# Patient Record
Sex: FEMALE | Race: WHITE | NOT HISPANIC OR LATINO | Employment: FULL TIME | RURAL
[De-identification: names, ages, dates, MRNs, and addresses within clinical notes are randomized per-mention and may not be internally consistent; named-entity substitution may affect disease eponyms.]

---

## 2022-02-17 ENCOUNTER — PATIENT ROUNDING (BHMG ONLY) (OUTPATIENT)
Dept: FAMILY MEDICINE CLINIC | Facility: CLINIC | Age: 42
End: 2022-02-17

## 2022-02-17 ENCOUNTER — OFFICE VISIT (OUTPATIENT)
Dept: FAMILY MEDICINE CLINIC | Facility: CLINIC | Age: 42
End: 2022-02-17

## 2022-02-17 VITALS
HEART RATE: 80 BPM | HEIGHT: 55 IN | SYSTOLIC BLOOD PRESSURE: 126 MMHG | BODY MASS INDEX: 47.9 KG/M2 | WEIGHT: 207 LBS | RESPIRATION RATE: 16 BRPM | TEMPERATURE: 97.3 F | DIASTOLIC BLOOD PRESSURE: 80 MMHG | OXYGEN SATURATION: 98 %

## 2022-02-17 DIAGNOSIS — E66.01 CLASS 3 SEVERE OBESITY DUE TO EXCESS CALORIES WITH SERIOUS COMORBIDITY AND BODY MASS INDEX (BMI) OF 50.0 TO 59.9 IN ADULT: ICD-10-CM

## 2022-02-17 DIAGNOSIS — J45.20 MILD INTERMITTENT ASTHMA WITHOUT COMPLICATION: ICD-10-CM

## 2022-02-17 DIAGNOSIS — F41.9 ANXIETY: ICD-10-CM

## 2022-02-17 DIAGNOSIS — R60.9 PERIPHERAL EDEMA: Primary | ICD-10-CM

## 2022-02-17 DIAGNOSIS — R91.1 NODULE OF RIGHT LUNG: ICD-10-CM

## 2022-02-17 DIAGNOSIS — M54.50 CHRONIC BILATERAL LOW BACK PAIN WITHOUT SCIATICA: ICD-10-CM

## 2022-02-17 DIAGNOSIS — K76.0 FATTY LIVER: ICD-10-CM

## 2022-02-17 DIAGNOSIS — G89.29 CHRONIC BILATERAL LOW BACK PAIN WITHOUT SCIATICA: ICD-10-CM

## 2022-02-17 DIAGNOSIS — Z00.00 WELLNESS EXAMINATION: ICD-10-CM

## 2022-02-17 DIAGNOSIS — G43.109 MIGRAINE WITH AURA AND WITHOUT STATUS MIGRAINOSUS, NOT INTRACTABLE: ICD-10-CM

## 2022-02-17 DIAGNOSIS — M25.521 RIGHT ELBOW PAIN: ICD-10-CM

## 2022-02-17 PROBLEM — R60.0 PERIPHERAL EDEMA: Status: ACTIVE | Noted: 2022-02-17

## 2022-02-17 PROCEDURE — 99204 OFFICE O/P NEW MOD 45 MIN: CPT | Performed by: NURSE PRACTITIONER

## 2022-02-17 RX ORDER — ZONISAMIDE 25 MG/1
CAPSULE ORAL
COMMUNITY
End: 2022-02-17 | Stop reason: SDUPTHER

## 2022-02-17 RX ORDER — BUSPIRONE HYDROCHLORIDE 5 MG/1
TABLET ORAL 2 TIMES DAILY
COMMUNITY
End: 2022-02-17 | Stop reason: SDUPTHER

## 2022-02-17 RX ORDER — LEVALBUTEROL TARTRATE 45 UG/1
AEROSOL, METERED ORAL
COMMUNITY
End: 2022-02-17 | Stop reason: SDUPTHER

## 2022-02-17 RX ORDER — BUSPIRONE HYDROCHLORIDE 7.5 MG/1
7.5 TABLET ORAL 2 TIMES DAILY PRN
Qty: 60 TABLET | Refills: 5 | Status: SHIPPED | OUTPATIENT
Start: 2022-02-17 | End: 2022-02-17 | Stop reason: CLARIF

## 2022-02-17 RX ORDER — FUROSEMIDE 20 MG/1
TABLET ORAL
COMMUNITY
End: 2022-02-17 | Stop reason: SDUPTHER

## 2022-02-17 RX ORDER — DICLOFENAC 35 MG/1
CAPSULE ORAL
COMMUNITY
End: 2022-02-17

## 2022-02-17 RX ORDER — ZONISAMIDE 25 MG/1
25 CAPSULE ORAL 2 TIMES DAILY
Qty: 60 CAPSULE | Refills: 5 | Status: SHIPPED | OUTPATIENT
Start: 2022-02-17

## 2022-02-17 RX ORDER — LEVALBUTEROL TARTRATE 45 UG/1
2 AEROSOL, METERED ORAL EVERY 6 HOURS PRN
Qty: 15 G | Refills: 5 | Status: SHIPPED | OUTPATIENT
Start: 2022-02-17

## 2022-02-17 RX ORDER — OXYCODONE HYDROCHLORIDE 5 MG/1
CAPSULE ORAL
COMMUNITY
End: 2022-02-17

## 2022-02-17 RX ORDER — BUSPIRONE HYDROCHLORIDE 15 MG/1
7.5 TABLET ORAL 2 TIMES DAILY
Qty: 30 TABLET | Refills: 5 | Status: SHIPPED | OUTPATIENT
Start: 2022-02-17

## 2022-02-17 RX ORDER — FUROSEMIDE 20 MG/1
20 TABLET ORAL DAILY PRN
Qty: 30 TABLET | Refills: 5 | Status: SHIPPED | OUTPATIENT
Start: 2022-02-17

## 2022-02-17 NOTE — PROGRESS NOTES
"Call from [facility] \"her insurance will not cover her Buspar 7.5 mg bid, but they will cover 15 mg one half twice daily\" spoke to provider and verbal ok for the change\" E-rx done  "

## 2022-02-17 NOTE — PROGRESS NOTES
February 17, 2022    Hello, may I speak with Anna Means?    My name is Leanna      I am  with [facility]  [address] [facility] [address] [phone].    Before we get started may I verify your date of birth? 1980    I am calling to officially welcome you to our practice and ask about your recent visit. Is this a good time to talk? yes    Tell me about your visit with us. What things went well?  It went well, everything was great. No improvements needed.       We're always looking for ways to make our patients' experiences even better. Do you have recommendations on ways we may improve?  no    Overall were you satisfied with your first visit to our practice? yes       I appreciate you taking the time to speak with me today. Is there anything else I can do for you? no      Thank you, and have a great day.

## 2022-02-17 NOTE — PROGRESS NOTES
Subjective   Chief Complaint:  Visit to [facility], multiple issues    History of Present Illness:  This 42 y.o. female was seen in the office today.    Peripheral edema: Reports stable on Lasix as needed-request refill.    Migraines: Reports stable on sonogram-reports no recent flareups.    Anxiety: Reports uses BuSpar sparingly, reports not interested in daily medication at this point.    Mild intermittent asthma: Reports stable on Xopenex, reports unable to take albuterol.    Fatty liver: Reports prior diagnosis-reports last ultrasound was clear of any cirrhotic/porous tissue.    Right lung nodule: Reports this was found approximately 1 year ago and sol-rayed in November.  Reports no changes.    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    Chronic back pain: Reports chronic low back pain with some degenerative changes-has a disc she has on hand.  Reports that currently not flared up.    Allergies   Allergen Reactions   • Diphenhydramine Unknown - Low Severity   • Gabapentin Delirium   • Topiramate Hallucinations   • Dicyclomine Palpitations      Current Outpatient Medications on File Prior to Visit   Medication Sig   • [DISCONTINUED] busPIRone (BUSPAR) 5 MG tablet 2 (Two) Times a Day.   • [DISCONTINUED] furosemide (LASIX) 20 MG tablet furosemide 20 mg tablet   TAKE 1/2 TO 1 TABLET BY MOUTH EVERY DAY AS NEEDED   • [DISCONTINUED] levalbuterol (Xopenex HFA) 45 MCG/ACT inhaler Xopenex HFA 45 mcg/actuation aerosol inhaler   • [DISCONTINUED] zonisamide (ZONEGRAN) 25 MG capsule zonisamide 25 mg capsule   TAKE 1 CAPSULE BY MOUTH TWICE DAILY   • [DISCONTINUED] Diclofenac 35 MG capsule diclofenac submicronized 35 mg capsule   • [DISCONTINUED] oxyCODONE (OXY-IR) 5 MG capsule oxycodone 5 mg capsule     No current facility-administered medications on file prior to visit.      Past Medical, Surgical, Social, and Family History:  History reviewed. No pertinent past medical  history.  History reviewed. No pertinent surgical history.  Social History     Socioeconomic History   • Marital status:    Tobacco Use   • Smoking status: Former Smoker     Quit date: 2005     Years since quittin.1     History reviewed. No pertinent family history.  Objective   Physical Exam  Vitals and nursing note reviewed.   Constitutional:       General: She is not in acute distress.     Appearance: Normal appearance. She is obese. She is not diaphoretic.   HENT:      Head: Normocephalic and atraumatic.      Nose: Nose normal.   Eyes:      Conjunctiva/sclera: Conjunctivae normal.      Pupils: Pupils are equal, round, and reactive to light.   Neck:      Thyroid: No thyroid mass, thyromegaly or thyroid tenderness.      Vascular: No carotid bruit or JVD.      Trachea: No tracheal deviation.   Cardiovascular:      Rate and Rhythm: Normal rate and regular rhythm.      Pulses:           Dorsalis pedis pulses are 2+ on the right side and 2+ on the left side.        Posterior tibial pulses are 2+ on the right side and 2+ on the left side.      Heart sounds: Normal heart sounds. No murmur heard.  No friction rub. No gallop.    Pulmonary:      Effort: Pulmonary effort is normal. No respiratory distress.      Breath sounds: Normal breath sounds. No wheezing.   Abdominal:      General: Bowel sounds are normal.      Palpations: Abdomen is soft.      Tenderness: There is no abdominal tenderness. There is no guarding.   Musculoskeletal:         General: No tenderness or deformity. Normal range of motion.      Cervical back: Normal range of motion and neck supple. No tenderness.   Lymphadenopathy:      Cervical: No cervical adenopathy.   Skin:     General: Skin is warm and dry.      Capillary Refill: Capillary refill takes less than 2 seconds.   Neurological:      Mental Status: She is alert and oriented to person, place, and time.   Psychiatric:         Behavior: Behavior normal.         Thought Content:  "Thought content normal.         Judgment: Judgment normal.     /80   Pulse 80   Temp 97.3 °F (36.3 °C)   Resp 16   Ht 133.4 cm (52.5\")   Wt 93.9 kg (207 lb)   SpO2 98%   BMI 52.80 kg/m²     Assessment/Plan   Diagnoses and all orders for this visit:    1. Peripheral edema (Primary)  -     furosemide (LASIX) 20 MG tablet; Take 1 tablet by mouth Daily As Needed (Fluid retention).  Dispense: 30 tablet; Refill: 5    2. Migraine with aura and without status migrainosus, not intractable  -     zonisamide (ZONEGRAN) 25 MG capsule; Take 1 capsule by mouth 2 (Two) Times a Day.  Dispense: 60 capsule; Refill: 5    3. Mild intermittent asthma without complication  -     levalbuterol (Xopenex HFA) 45 MCG/ACT inhaler; Inhale 2 puffs Every 6 (Six) Hours As Needed for Wheezing.  Dispense: 15 g; Refill: 5    4. Anxiety  -     Discontinue: busPIRone (BUSPAR) 7.5 MG tablet; Take 1 tablet by mouth 2 (Two) Times a Day As Needed (Anxiety).  Dispense: 60 tablet; Refill: 5  -     busPIRone (BUSPAR) 15 MG tablet; Take 0.5 tablets by mouth 2 (Two) Times a Day.  Dispense: 30 tablet; Refill: 5    5. Wellness examination  -     CBC & Differential  -     Comprehensive Metabolic Panel  -     TSH  -     Lipid Panel  -     Hepatitis C antibody    6. Fatty liver  -     CBC & Differential  -     Comprehensive Metabolic Panel  -     TSH  -     Lipid Panel  -     Hepatitis C antibody    7. Class 3 severe obesity due to excess calories with serious comorbidity and body mass index (BMI) of 50.0 to 59.9 in adult (HCC)    8. Nodule of right lung  -     CT Chest Without Contrast; Future    9. Right elbow pain  Comments:  Repetitive overuse-advised rest, over-the-counter Voltaren gel as needed    10. Chronic bilateral low back pain without sciatica    Discussion:  Advised and educated plan of care.      Patient's Body mass index is 52.8 kg/m². indicating that she is morbidly obese (BMI > 40 or > 35 with obesity - related health condition). " Obesity-related health conditions include the following: none. Obesity is newly identified. BMI is is above average; BMI management plan is completed. We discussed portion control and increasing exercise..    Follow-up:  Return in about 6 months (around 8/17/2022) for Follow-Up.    Electronically signed by BRANDY Mcdonnell, 02/17/22, 10:45 AM CST.

## 2022-04-14 ENCOUNTER — TELEMEDICINE (OUTPATIENT)
Dept: FAMILY MEDICINE CLINIC | Facility: CLINIC | Age: 42
End: 2022-04-14

## 2022-04-14 DIAGNOSIS — J02.9 ACUTE PHARYNGITIS, UNSPECIFIED ETIOLOGY: Primary | ICD-10-CM

## 2022-04-14 PROCEDURE — 99213 OFFICE O/P EST LOW 20 MIN: CPT | Performed by: NURSE PRACTITIONER

## 2022-04-14 RX ORDER — AZITHROMYCIN 250 MG/1
TABLET, FILM COATED ORAL
Qty: 6 TABLET | Refills: 0 | Status: SHIPPED | OUTPATIENT
Start: 2022-04-14 | End: 2022-04-19

## 2022-05-13 ENCOUNTER — HOSPITAL ENCOUNTER (OUTPATIENT)
Dept: CT IMAGING | Facility: HOSPITAL | Age: 42
Discharge: HOME | End: 2022-05-13
Admitting: NURSE PRACTITIONER

## 2022-05-13 DIAGNOSIS — R91.1 NODULE OF RIGHT LUNG: ICD-10-CM

## 2022-05-13 DIAGNOSIS — R91.1 NODULE OF RIGHT LUNG: Primary | ICD-10-CM

## 2022-05-13 PROCEDURE — 71250 CT THORAX DX C-: CPT

## 2022-05-13 NOTE — PROGRESS NOTES
Please call the patient - Advise no comparison in the [facility] system but nodule is < 6 cm.  If had comparison, likely could f/u 1 year but without, I would repeat in 6 months this time.  Future order is placed.    Electronically signed by BRANDY Mcdonnell, 05/13/22, 1:22 PM CDT.

## 2022-05-31 ENCOUNTER — OFFICE VISIT (OUTPATIENT)
Dept: FAMILY MEDICINE CLINIC | Facility: CLINIC | Age: 42
End: 2022-05-31

## 2022-05-31 VITALS
WEIGHT: 201.2 LBS | BODY MASS INDEX: 46.56 KG/M2 | HEART RATE: 78 BPM | SYSTOLIC BLOOD PRESSURE: 118 MMHG | HEIGHT: 55 IN | OXYGEN SATURATION: 99 % | DIASTOLIC BLOOD PRESSURE: 76 MMHG

## 2022-05-31 DIAGNOSIS — Z01.419 ROUTINE GYNECOLOGICAL EXAMINATION: ICD-10-CM

## 2022-05-31 DIAGNOSIS — R10.9 LEFT FLANK PAIN: Primary | ICD-10-CM

## 2022-05-31 DIAGNOSIS — H66.91 OTITIS, RIGHT: ICD-10-CM

## 2022-05-31 PROCEDURE — 99214 OFFICE O/P EST MOD 30 MIN: CPT | Performed by: NURSE PRACTITIONER

## 2022-05-31 RX ORDER — AMOXICILLIN 500 MG/1
500 TABLET, FILM COATED ORAL 3 TIMES DAILY
Qty: 30 TABLET | Refills: 0 | Status: SHIPPED | OUTPATIENT
Start: 2022-05-31 | End: 2022-06-10

## 2022-05-31 NOTE — PROGRESS NOTES
"Subjective   Chief Complaint:  Earache, routine Pap exam    History of Present Illness:  This 42 y.o. female was seen in the office today.  She is seen today for routine Pap exam.  Reports has been having left flank pain for about 3 days.  Reports her \"kidney\" has been aching.  Reports has had a history of benign cyst in the past.  Reports history of partial cervixectomy due to abnormal cells.  Reports got cytologies done twice a year for few years but has been on the yearly plan at this point.  Also reports an earache in the right ear after swimming.    Allergies   Allergen Reactions   • Diphenhydramine Unknown - Low Severity   • Gabapentin Delirium   • Topiramate Hallucinations   • Dicyclomine Palpitations      Current Outpatient Medications on File Prior to Visit   Medication Sig   • busPIRone (BUSPAR) 15 MG tablet Take 0.5 tablets by mouth 2 (Two) Times a Day.   • furosemide (LASIX) 20 MG tablet Take 1 tablet by mouth Daily As Needed (Fluid retention).   • levalbuterol (Xopenex HFA) 45 MCG/ACT inhaler Inhale 2 puffs Every 6 (Six) Hours As Needed for Wheezing.   • zonisamide (ZONEGRAN) 25 MG capsule Take 1 capsule by mouth 2 (Two) Times a Day.     No current facility-administered medications on file prior to visit.      Past Medical, Surgical, Social, and Family History:  History reviewed. No pertinent past medical history.  History reviewed. No pertinent surgical history.  Social History     Socioeconomic History   • Marital status:    Tobacco Use   • Smoking status: Former Smoker     Quit date: 2005     Years since quittin.4     History reviewed. No pertinent family history.  Objective   Physical Exam  Vitals and nursing note reviewed. Exam conducted with a chaperone present (Nikki Beth).   Constitutional:       General: She is not in acute distress.     Appearance: She is not diaphoretic.   HENT:      Head: Normocephalic and atraumatic.      Ears:      Comments: Right ear TM erythematous and " bulging, canal clear.     Nose: Nose normal.   Eyes:      Conjunctiva/sclera: Conjunctivae normal.      Pupils: Pupils are equal, round, and reactive to light.   Neck:      Thyroid: No thyroid mass, thyromegaly or thyroid tenderness.      Vascular: No carotid bruit or JVD.      Trachea: No tracheal deviation.   Cardiovascular:      Rate and Rhythm: Normal rate and regular rhythm.      Heart sounds: Normal heart sounds. No murmur heard.    No friction rub. No gallop.   Pulmonary:      Effort: Pulmonary effort is normal. No respiratory distress.      Breath sounds: Normal breath sounds. No wheezing.   Chest:   Breasts:      Right: Normal. No swelling, bleeding, inverted nipple, mass, nipple discharge, skin change, tenderness, axillary adenopathy or supraclavicular adenopathy.      Left: Normal. No swelling, bleeding, inverted nipple, mass, nipple discharge, skin change, tenderness, axillary adenopathy or supraclavicular adenopathy.       Abdominal:      General: Bowel sounds are normal.      Palpations: Abdomen is soft.      Tenderness: There is no abdominal tenderness. There is no guarding.   Genitourinary:     Labia:         Right: No rash, tenderness or lesion.         Left: No rash, tenderness or lesion.       Vagina: No vaginal discharge, erythema, tenderness, bleeding, lesions or prolapsed vaginal walls.      Cervix: No discharge, lesion, erythema or cervical bleeding.      Uterus: Normal.       Comments: Pap collected in usual manner  Musculoskeletal:         General: No tenderness or deformity. Normal range of motion.      Cervical back: Normal range of motion and neck supple. No tenderness.   Lymphadenopathy:      Cervical: No cervical adenopathy.      Upper Body:      Right upper body: No supraclavicular, axillary or pectoral adenopathy.      Left upper body: No supraclavicular, axillary or pectoral adenopathy.   Skin:     General: Skin is warm and dry.      Capillary Refill: Capillary refill takes less than  "2 seconds.   Neurological:      Mental Status: She is alert and oriented to person, place, and time.   Psychiatric:         Behavior: Behavior normal.         Thought Content: Thought content normal.         Judgment: Judgment normal.     /76   Pulse 78   Ht 133.4 cm (52.5\")   Wt 91.3 kg (201 lb 3.2 oz)   SpO2 99%   BMI 51.32 kg/m²     Assessment & Plan   Diagnoses and all orders for this visit:    1. Left flank pain (Primary)  -     CT Abdomen Pelvis Without Contrast; Future  -     UA / M With / Rflx Culture([facility] ONLY) - Urine, Clean Catch    2. Routine gynecological examination  -     Liquid-based Pap Smear, Screening; Future    3. Otitis, right    Other orders  -     amoxicillin (AMOXIL) 500 MG tablet; Take 1 tablet by mouth 3 (Three) Times a Day for 10 days.  Dispense: 30 tablet; Refill: 0    Discussion:  Advised and educated plan of care.    Time = 30 minutes    Follow-up:  Return for As Needed - Depending on Test Results - Will Call.    Electronically signed by BRANDY Mcdonnell, 05/31/22, 7:49 AM CDT.  "

## 2022-06-01 ENCOUNTER — TELEPHONE (OUTPATIENT)
Dept: FAMILY MEDICINE CLINIC | Facility: CLINIC | Age: 42
End: 2022-06-01

## 2022-06-01 LAB
ALBUMIN SERPL-MCNC: 4.3 G/DL (ref 3.5–5.2)
ALBUMIN/GLOB SERPL: 1.3 G/DL
ALP SERPL-CCNC: 79 U/L (ref 39–117)
ALT SERPL-CCNC: 17 U/L (ref 1–33)
APPEARANCE UR: CLEAR
AST SERPL-CCNC: 14 U/L (ref 1–32)
BACTERIA #/AREA URNS HPF: NORMAL /HPF
BASOPHILS # BLD AUTO: 0.05 10*3/MM3 (ref 0–0.2)
BASOPHILS NFR BLD AUTO: 0.5 % (ref 0–1.5)
BILIRUB SERPL-MCNC: 0.2 MG/DL (ref 0–1.2)
BILIRUB UR QL STRIP: NEGATIVE
BUN SERPL-MCNC: 12 MG/DL (ref 6–20)
BUN/CREAT SERPL: 15.8 (ref 7–25)
CALCIUM SERPL-MCNC: 9.6 MG/DL (ref 8.6–10.5)
CASTS URNS QL MICRO: NORMAL /LPF
CHLORIDE SERPL-SCNC: 104 MMOL/L (ref 98–107)
CHOLEST SERPL-MCNC: 219 MG/DL (ref 0–200)
CO2 SERPL-SCNC: 21.5 MMOL/L (ref 22–29)
COLOR UR: YELLOW
CREAT SERPL-MCNC: 0.76 MG/DL (ref 0.57–1)
EGFRCR SERPLBLD CKD-EPI 2021: 100.5 ML/MIN/1.73
EOSINOPHIL # BLD AUTO: 0.13 10*3/MM3 (ref 0–0.4)
EOSINOPHIL NFR BLD AUTO: 1.3 % (ref 0.3–6.2)
EPI CELLS #/AREA URNS HPF: NORMAL /HPF (ref 0–10)
ERYTHROCYTE [DISTWIDTH] IN BLOOD BY AUTOMATED COUNT: 12.5 % (ref 12.3–15.4)
GLOBULIN SER CALC-MCNC: 3.2 GM/DL
GLUCOSE SERPL-MCNC: 110 MG/DL (ref 65–99)
GLUCOSE UR QL STRIP: NEGATIVE
HCT VFR BLD AUTO: 45 % (ref 34–46.6)
HCV AB S/CO SERPL IA: 0.1 S/CO RATIO (ref 0–0.9)
HDLC SERPL-MCNC: 51 MG/DL (ref 40–60)
HGB BLD-MCNC: 14.8 G/DL (ref 12–15.9)
HGB UR QL STRIP: NEGATIVE
IMM GRANULOCYTES # BLD AUTO: 0.04 10*3/MM3 (ref 0–0.05)
IMM GRANULOCYTES NFR BLD AUTO: 0.4 % (ref 0–0.5)
KETONES UR QL STRIP: NEGATIVE
LDLC SERPL CALC-MCNC: 136 MG/DL (ref 0–100)
LEUKOCYTE ESTERASE UR QL STRIP: NEGATIVE
LYMPHOCYTES # BLD AUTO: 2.14 10*3/MM3 (ref 0.7–3.1)
LYMPHOCYTES NFR BLD AUTO: 21.9 % (ref 19.6–45.3)
MCH RBC QN AUTO: 28.7 PG (ref 26.6–33)
MCHC RBC AUTO-ENTMCNC: 32.9 G/DL (ref 31.5–35.7)
MCV RBC AUTO: 87.2 FL (ref 79–97)
MICRO URNS: NORMAL
MICRO URNS: NORMAL
MONOCYTES # BLD AUTO: 0.67 10*3/MM3 (ref 0.1–0.9)
MONOCYTES NFR BLD AUTO: 6.9 % (ref 5–12)
NEUTROPHILS # BLD AUTO: 6.72 10*3/MM3 (ref 1.7–7)
NEUTROPHILS NFR BLD AUTO: 69 % (ref 42.7–76)
NITRITE UR QL STRIP: NEGATIVE
NRBC BLD AUTO-RTO: 0 /100 WBC (ref 0–0.2)
PH UR STRIP: 6.5 [PH] (ref 5–7.5)
PLATELET # BLD AUTO: 337 10*3/MM3 (ref 140–450)
POTASSIUM SERPL-SCNC: 4.4 MMOL/L (ref 3.5–5.2)
PROT SERPL-MCNC: 7.5 G/DL (ref 6–8.5)
PROT UR QL STRIP: NEGATIVE
RBC # BLD AUTO: 5.16 10*6/MM3 (ref 3.77–5.28)
RBC #/AREA URNS HPF: NORMAL /HPF (ref 0–2)
SODIUM SERPL-SCNC: 138 MMOL/L (ref 136–145)
SP GR UR STRIP: 1.02 (ref 1–1.03)
TRIGL SERPL-MCNC: 180 MG/DL (ref 0–150)
TSH SERPL DL<=0.005 MIU/L-ACNC: 1.87 UIU/ML (ref 0.27–4.2)
URINALYSIS REFLEX: NORMAL
UROBILINOGEN UR STRIP-MCNC: 0.2 MG/DL (ref 0.2–1)
VLDLC SERPL CALC-MCNC: 32 MG/DL (ref 5–40)
WBC # BLD AUTO: 9.75 10*3/MM3 (ref 3.4–10.8)
WBC #/AREA URNS HPF: NORMAL /HPF (ref 0–5)

## 2022-06-01 NOTE — TELEPHONE ENCOUNTER
Hub staff attempted to follow warm transfer process and was unsuccessful     Caller: Anna Means    Relationship to patient: Self    Best call back number: [phone]    Patient is needing: PATIENT ATTEMPTED TO RETURN MISSED CALL CONCERNING LAB RESULTS.

## 2022-06-02 LAB
CYTOLOGIST CVX/VAG CYTO: NORMAL
CYTOLOGY CVX/VAG DOC CYTO: NORMAL
DX ICD CODE: NORMAL
HIV 1 & 2 AB SER-IMP: NORMAL
OTHER STN SPEC: NORMAL
STAT OF ADQ CVX/VAG CYTO-IMP: NORMAL

## 2022-06-02 NOTE — PROGRESS NOTES
Please call the patient - Pap is negative.  Recommend  1 year since has had abnormal in the past    Electronically signed by BRANDY Mcdonnell, 06/02/22, 2:43 PM CDT.

## 2022-06-06 ENCOUNTER — HOSPITAL ENCOUNTER (OUTPATIENT)
Dept: CT IMAGING | Facility: HOSPITAL | Age: 42
Discharge: HOME | End: 2022-06-06
Admitting: NURSE PRACTITIONER

## 2022-06-06 DIAGNOSIS — R10.9 LEFT FLANK PAIN: ICD-10-CM

## 2022-06-06 PROCEDURE — 74176 CT ABD & PELVIS W/O CONTRAST: CPT

## 2022-06-06 NOTE — PROGRESS NOTES
Please call the patient - No renal stones.  Findings as listed - all non-acute and does not explain pain.  See if doing better and needs a f/u if not    Electronically signed by BRANDY Mcdonnell, 06/06/22, 3:16 PM CDT.

## 2022-07-21 ENCOUNTER — TELEPHONE (OUTPATIENT)
Dept: FAMILY MEDICINE CLINIC | Facility: CLINIC | Age: 42
End: 2022-07-21

## 2022-07-21 RX ORDER — METHYLPREDNISOLONE 4 MG/1
TABLET ORAL
Qty: 1 EACH | Refills: 0 | Status: SHIPPED | OUTPATIENT
Start: 2022-07-21 | End: 2022-08-17

## 2022-07-21 RX ORDER — AZITHROMYCIN 250 MG/1
TABLET, FILM COATED ORAL
Qty: 6 TABLET | Refills: 0 | Status: SHIPPED | OUTPATIENT
Start: 2022-07-21 | End: 2022-07-26

## 2022-07-22 ENCOUNTER — TELEPHONE (OUTPATIENT)
Dept: INTERNAL MEDICINE | Facility: CLINIC | Age: 42
End: 2022-07-22

## 2022-08-04 NOTE — TELEPHONE ENCOUNTER
I called the pt to see how she was feeling and she said at times her chest still feels tight but overall she is much better and will keep her appt this month

## 2022-08-17 ENCOUNTER — OFFICE VISIT (OUTPATIENT)
Dept: FAMILY MEDICINE CLINIC | Facility: CLINIC | Age: 42
End: 2022-08-17

## 2022-08-17 VITALS
DIASTOLIC BLOOD PRESSURE: 90 MMHG | SYSTOLIC BLOOD PRESSURE: 122 MMHG | RESPIRATION RATE: 14 BRPM | HEIGHT: 55 IN | WEIGHT: 204 LBS | HEART RATE: 76 BPM | OXYGEN SATURATION: 97 % | BODY MASS INDEX: 47.21 KG/M2

## 2022-08-17 DIAGNOSIS — G43.109 MIGRAINE WITH AURA AND WITHOUT STATUS MIGRAINOSUS, NOT INTRACTABLE: ICD-10-CM

## 2022-08-17 DIAGNOSIS — R60.9 PERIPHERAL EDEMA: ICD-10-CM

## 2022-08-17 DIAGNOSIS — F41.9 ANXIETY: Primary | ICD-10-CM

## 2022-08-17 DIAGNOSIS — J45.20 MILD INTERMITTENT ASTHMA WITHOUT COMPLICATION: ICD-10-CM

## 2022-08-17 DIAGNOSIS — R73.9 ELEVATED BLOOD SUGAR: ICD-10-CM

## 2022-08-17 DIAGNOSIS — E66.01 CLASS 3 SEVERE OBESITY DUE TO EXCESS CALORIES WITHOUT SERIOUS COMORBIDITY WITH BODY MASS INDEX (BMI) OF 50.0 TO 59.9 IN ADULT: ICD-10-CM

## 2022-08-17 DIAGNOSIS — M25.571 TOE JOINT PAIN, RIGHT: ICD-10-CM

## 2022-08-17 LAB
EXPIRATION DATE: NORMAL
HBA1C MFR BLD: 5.6 %
Lab: NORMAL

## 2022-08-17 PROCEDURE — 83036 HEMOGLOBIN GLYCOSYLATED A1C: CPT | Performed by: NURSE PRACTITIONER

## 2022-08-17 PROCEDURE — 99214 OFFICE O/P EST MOD 30 MIN: CPT | Performed by: NURSE PRACTITIONER

## 2022-08-17 RX ORDER — METFORMIN HYDROCHLORIDE 750 MG/1
750 TABLET, EXTENDED RELEASE ORAL
Qty: 30 TABLET | Refills: 5 | Status: SHIPPED | OUTPATIENT
Start: 2022-08-17 | End: 2023-01-23

## 2022-08-17 NOTE — PROGRESS NOTES
Subjective   Chief Complaint:  Six-month follow-up    History of Present Illness:  This 42 y.o. female was seen in the office today.  She presents today for 6-month follow-up visit.     The patient continues on BuSpar 7.5 mg as needed and explains this is most often taken from October through March. She denies trying the 15 mg dose, and the 7.5 mg dose is well tolerated.     She continues to have migraines infrequently, and her last migraine was on 07/15/2022 at the time of her COVID-19 infection.     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 She has needed to use her rescue inhaler about twice per month since her last visit. She notes her asthma is most bothersome in the wintertime. The patient had been taking levocetirizine daily but experienced waning benefit with this, and she inquired if fexofenadine would be appropriate for her. Flonase was tried in the past but caused headaches.     She continues with Lasix as needed for her lower extremity edema and has only needed to use this once over the summer. She has been trying to stay well hydrated, which helps.     The patient endorses exercising for at least 30 minutes per day. She has also been working on diet modification but has had difficulty with weight loss. She usually has 3 meals per day but has had trouble with this recently due to her transition to nights at work. Her last hemoglobin A1c was in the lower 5 percent. The patient inquired about whether insulin resistance may be an issue for her. At times after eating, she reports feeling nauseous with sweating and feeling overheated, which improves with lying on the floor. When eating dinner around 7:00 PM to 8:00 PM, she will occasionally wake from sleep around 1:00 AM with diarrhea, sweating, and feeling too warm. She denies any history of gastric surgery but does report a history of irritable bowel syndrome and has  been following a FODMAP diet, which helps.     The patient adds that she has always bruised easily. She complains of bruising on her right 5th toe and expressed concern that she may have fractured this.     She reports a non-nuclear family history of diabetes.     Allergies   Allergen Reactions   • Diphenhydramine Unknown - Low Severity   • Gabapentin Delirium   • Topiramate Hallucinations   • Dicyclomine Palpitations      Current Outpatient Medications on File Prior to Visit   Medication Sig   • busPIRone (BUSPAR) 15 MG tablet Take 0.5 tablets by mouth 2 (Two) Times a Day.   • furosemide (LASIX) 20 MG tablet Take 1 tablet by mouth Daily As Needed (Fluid retention).   • levalbuterol (Xopenex HFA) 45 MCG/ACT inhaler Inhale 2 puffs Every 6 (Six) Hours As Needed for Wheezing.   • zonisamide (ZONEGRAN) 25 MG capsule Take 1 capsule by mouth 2 (Two) Times a Day.   • [DISCONTINUED] methylPREDNISolone (MEDROL) 4 MG dose pack Take as directed on package instructions.     No current facility-administered medications on file prior to visit.      Past Medical, Surgical, Social, and Family History:  Past Medical History:   Diagnosis Date   • Allergic    • Anxiety      History reviewed. No pertinent surgical history.  Social History     Socioeconomic History   • Marital status:    Tobacco Use   • Smoking status: Former Smoker     Quit date: 2005     Years since quittin.6     History reviewed. No pertinent family history.  Objective   Physical Exam  Vitals and nursing note reviewed.   Constitutional:       General: She is not in acute distress.     Appearance: She is obese. She is not diaphoretic.   HENT:      Head: Normocephalic and atraumatic.      Nose: Nose normal.   Eyes:      Conjunctiva/sclera: Conjunctivae normal.      Pupils: Pupils are equal, round, and reactive to light.   Neck:      Thyroid: No thyroid mass, thyromegaly or thyroid tenderness.      Vascular: No carotid bruit or JVD.      Trachea: No  "tracheal deviation.   Cardiovascular:      Rate and Rhythm: Normal rate and regular rhythm.      Heart sounds: Normal heart sounds. No murmur heard.    No friction rub. No gallop.      Comments: Posterior tibial pulses are strong and regular bilaterally.   Pulmonary:      Effort: Pulmonary effort is normal. No respiratory distress.      Breath sounds: Normal breath sounds. No wheezing.   Abdominal:      General: Bowel sounds are normal. There is no abdominal bruit.      Palpations: Abdomen is soft.      Tenderness: There is no abdominal tenderness. There is no guarding.   Musculoskeletal:         General: No tenderness or deformity. Normal range of motion.      Cervical back: Normal range of motion and neck supple. No tenderness.      Right lower leg: No edema.      Left lower leg: No edema.      Comments: There is a small bruise on her right 5th toe. The toe appears to have good alignment.    Lymphadenopathy:      Cervical: No cervical adenopathy.   Skin:     General: Skin is warm and dry.      Capillary Refill: Capillary refill takes less than 2 seconds.   Neurological:      Mental Status: She is alert and oriented to person, place, and time.   Psychiatric:         Behavior: Behavior normal.         Thought Content: Thought content normal.         Judgment: Judgment normal.     /90   Pulse 76   Resp 14   Ht 133.4 cm (52.5\")   Wt 92.5 kg (204 lb)   SpO2 97%   BMI 52.04 kg/m²     .  Assessment & Plan   Diagnoses and all orders for this visit:    1. Anxiety (Primary)    2. Migraine with aura and without status migrainosus, not intractable    3. Mild intermittent asthma without complication    4. Peripheral edema    5. Class 3 severe obesity due to excess calories without serious comorbidity with body mass index (BMI) of 50.0 to 59.9 in adult (HCC)  -     metFORMIN ER (Glucophage XR) 750 MG 24 hr tablet; Take 1 tablet by mouth Daily With Breakfast.  Dispense: 30 tablet; Refill: 5    6. Toe joint pain, " right  Comments:  #5    Discussion:  Advised and educated plan of care.  We will continue to monitor her mild intermittent asthma. On recent lab work, her blood glucose and triglycerides were slightly elevated, and a hemoglobin A1c obtained today was 5.6 percent. As she has a BMI over 30, trying metformin extended release 750 mg per day may help with her reactive hypoglycemic episodes and weight loss. She can also try using a mobile application to track her sugar intake, as reducing this may also help reduce the frequency of her reactive hypoglycemia. She will follow up in 6 months with labs prior to that visit.     Class 3 Severe Obesity (BMI >=40). Obesity-related health conditions include the following: none. Obesity is unchanged. BMI is is above average; BMI management plan is completed. We discussed portion control, increasing exercise and pharmacologic options including Metformin.    Follow-up:  Return in about 6 months (around 2/17/2023) for Follow-Up with Separate Fasting Lab Visit Prior.    Electronically signed by BRANDY Mcdonnell, 08/17/22, 7:37 AM CDT.    Transcribed from ambient dictation for BRANDY Mcdonnell by KHLOE GARCIA.  08/17/22   08:52 CDT    Patient verbalized consent to the visit recording.  I have personally performed the services described in this document as transcribed by the above individual, and it is both accurate and complete.  BRANDY Mcdonnell  8/17/2022  09:33 CDT

## 2022-08-26 ENCOUNTER — OFFICE VISIT (OUTPATIENT)
Dept: FAMILY MEDICINE CLINIC | Facility: CLINIC | Age: 42
End: 2022-08-26

## 2022-08-26 VITALS
SYSTOLIC BLOOD PRESSURE: 112 MMHG | HEART RATE: 83 BPM | HEIGHT: 55 IN | TEMPERATURE: 97.5 F | RESPIRATION RATE: 18 BRPM | BODY MASS INDEX: 47.07 KG/M2 | DIASTOLIC BLOOD PRESSURE: 80 MMHG | OXYGEN SATURATION: 99 % | WEIGHT: 203.4 LBS

## 2022-08-26 DIAGNOSIS — R00.2 PALPITATIONS: Primary | ICD-10-CM

## 2022-08-26 DIAGNOSIS — R11.0 NAUSEA: ICD-10-CM

## 2022-08-26 PROCEDURE — 99213 OFFICE O/P EST LOW 20 MIN: CPT | Performed by: NURSE PRACTITIONER

## 2022-08-26 RX ORDER — ONDANSETRON 4 MG/1
4 TABLET, FILM COATED ORAL EVERY 8 HOURS PRN
Qty: 12 TABLET | Refills: 0 | Status: SHIPPED | OUTPATIENT
Start: 2022-08-26

## 2022-08-26 NOTE — PROGRESS NOTES
"Chief Complaint  Medication Reaction (Palpitations )    Subjective    {Problem List  Visit Diagnosis   Encounters  Notes  Medications  Labs  Result Review Imaging  Media :23}    Anna Means presents to [facility]   History of Present Illness    Ms. Means was seen on 08/17/2022 and was started on metformin to help with her weight loss and reactive hypoglycemic episodes. She states she has not had any recent hypoglycemic episodes. Since taking the medicine, she explains she has had some diarrhea but also feels energetic. She mentions she had increased sweating and started to get palpitations which she attributed to her metabolism and adrenaline increasing.    As of 08/24/2022 she stopped taking her metformin due to palpitations. She states that she could not sleep at night due to the fluttering waking her up. She denies any chest pain. Her blood pressure is normal in the office today and her pulse is 83 beats per minute.    The patient denies anxiety attributing to her fluttering. She has not taken her Buspar since her anxiety has been under control. She states that previous stress has caused some flutters but when taking metformin, she felt them constantly. She explains she can not take decongestants or albuterol as they gave her the same feeling.    She explains that while in [place], in the early 2000's she had an allergic reaction to BENTYL and had an echocardiogram. She mentions since then, she has seen a cardiologist and wore a heart monitor for 24 to 48 hours on different occasions. She states her heart was \"in perfect shape\" but \"extra beats\" were shown. She denies having an echocardiogram in the last year.    Objective   Vital Signs:  /80 (BP Location: Left arm, Patient Position: Sitting, Cuff Size: Adult)   Pulse 83   Temp 97.5 °F (36.4 °C) (Infrared)   Resp 18   Ht 133.4 cm (52.5\")   Wt 92.3 kg (203 lb 6.4 oz)   SpO2 99%   BMI 51.88 kg/m² " "  Estimated body mass index is 51.88 kg/m² as calculated from the following:    Height as of this encounter: 133.4 cm (52.5\").    Weight as of this encounter: 92.3 kg (203 lb 6.4 oz).    {Class 3 Severe Obesity (BMI >=40). (Optional):60643}      Physical Exam  Cardiovascular:      Heart sounds: Normal heart sounds.        Result Review :{Labs  Result Review  Imaging  Med Tab  Media  Procedures  :23}  {The following data was reviewed by (Optional):74733}  {Ambulatory Labs (Optional):05563}  {Data reviewed (Optional):78630:::1}     ECG 12 Lead    Date/Time: 8/26/2022 6:33 PM  Performed by: Kvng Keenan APRN  Authorized by: Kvng Keenan APRN   Comparison: not compared with previous ECG   Previous ECG: no previous ECG available  Rhythm: sinus rhythm  Rate: normal  BPM: 75  Comments:  WV interval 14 ms   QRS interval 8 ms  QT interval 40 ms   No ST elevation.          Assessment and Plan {CC Problem List  Visit Diagnosis   ROS  Review (Popup)  [facility] Maintenance  Quality  BestPractice  Medications  SmartSets  SnapShot Encounters  Media :23}  Diagnoses and all orders for this visit:    1. Palpitations (Primary)  -     ECG 12 Lead  -     Holter Monitor - 72 Hour Up To 15 Days; Future  -     Adult Transthoracic Echo Complete W/ Cont if Necessary Per Protocol; Future  -     ondansetron (Zofran) 4 MG tablet; Take 1 tablet by mouth Every 8 (Eight) Hours As Needed for Nausea or Vomiting.  Dispense: 12 tablet; Refill: 0  -     UA / M With / Rflx Culture([facility] ONLY) - Urine, Clean Catch  -     CBC & Differential  -     Comprehensive Metabolic Panel  -     TSH  -     T4, Free    2. Nausea  -     ondansetron (Zofran) 4 MG tablet; Take 1 tablet by mouth Every 8 (Eight) Hours As Needed for Nausea or Vomiting.  Dispense: 12 tablet; Refill: 0  -     UA / M With / Rflx Culture([facility] ONLY) - Urine, Clean Catch  -     CBC & Differential  -     Comprehensive Metabolic Panel  -     TSH  -     T4, Free      1. " "Palpitations  - Will get an EKG today in the office.  - Will arrange an echocardiogram and Holter monitor for 1 week.  - Will obtain labs and a urinalysis    2. Reactive hypoglycemic episodes  - I advised the patient to hold off on restarting her metformin.    3. Nausea  - Will send Zofran    Advised and educated plan of care.      {Time Spent (Optional):71930}  Follow Up {Instructions Charge Capture  Follow-up Communications :23}  Return for As Needed - Depending on Test Results - Will Call.  Patient was given instructions and counseling regarding her condition or for health maintenance advice. Please see specific information pulled into the AVS if appropriate.     Transcribed from ambient dictation for BRANDY Mcdonnell by LICO NICOLE.  08/26/22   18:40 CDT    {JONNATHAN Provider Statement:55575::\"Patient verbalized consent to the visit recording.\"}    "

## 2022-08-26 NOTE — PROGRESS NOTES
"Subjective   Chief Complaint:  Medication reaction    History of Present Illness:  This 42 y.o. female was seen in the office today in regards to palpitations.     Ms. Means was seen on 08/17/2022 and was started on metformin to help with her weight loss and reactive hypoglycemic episodes. She states she has not had any recent hypoglycemic episodes. Since taking the medicine, she explains she has had some diarrhea but also feels energetic. She mentions she had increased sweating and started to get palpitations which she attributed to her metabolism and adrenaline increasing.    As of 08/24/2022 she stopped taking her metformin due to palpitations. She states that she could not sleep at night due to the fluttering waking her up. She denies any chest pain. Her blood pressure is normal in the office today and her pulse is 83 beats per minute.    The patient denies anxiety attributing to her fluttering. She has not taken her Buspar since her anxiety has been under control. She states that previous stress has caused some flutters but when taking metformin, she felt them constantly. She explains she can not take decongestants or albuterol as they gave her the same feeling.    She explains that while in [place], in the early 2000's she had an allergic reaction to BENTYL and had an echocardiogram. She mentions since then, she has seen a cardiologist and wore a heart monitor for 24 to 48 hours on different occasions. She states her heart was \"in perfect shape\" but \"extra beats\" were shown. She denies having an echocardiogram in the last year.    Allergies   Allergen Reactions   • Diphenhydramine Unknown - Low Severity   • Gabapentin Delirium   • Topiramate Hallucinations   • Dicyclomine Palpitations and Unknown - High Severity      Current Outpatient Medications on File Prior to Visit   Medication Sig   • busPIRone (BUSPAR) 15 MG tablet Take 0.5 tablets by mouth 2 (Two) Times a Day.   • furosemide (LASIX) 20 MG tablet " "Take 1 tablet by mouth Daily As Needed (Fluid retention).   • levalbuterol (Xopenex HFA) 45 MCG/ACT inhaler Inhale 2 puffs Every 6 (Six) Hours As Needed for Wheezing.   • zonisamide (ZONEGRAN) 25 MG capsule Take 1 capsule by mouth 2 (Two) Times a Day.   • metFORMIN ER (Glucophage XR) 750 MG 24 hr tablet Take 1 tablet by mouth Daily With Breakfast.     No current facility-administered medications on file prior to visit.      Past Medical, Surgical, Social, and Family History:  Past Medical History:   Diagnosis Date   • Allergic    • Anxiety      History reviewed. No pertinent surgical history.  Social History     Socioeconomic History   • Marital status:    Tobacco Use   • Smoking status: Former Smoker     Quit date: 2005     Years since quittin.6     History reviewed. No pertinent family history.  Objective   Physical Exam  Vitals reviewed.   Constitutional:       General: She is not in acute distress.     Appearance: Normal appearance. She is obese.   Cardiovascular:      Rate and Rhythm: Normal rate and regular rhythm.   Pulmonary:      Effort: Pulmonary effort is normal.      Breath sounds: Normal breath sounds.     /80 (BP Location: Left arm, Patient Position: Sitting, Cuff Size: Adult)   Pulse 83   Temp 97.5 °F (36.4 °C) (Infrared)   Resp 18   Ht 133.4 cm (52.5\")   Wt 92.3 kg (203 lb 6.4 oz)   LMP 08/10/2022 (Approximate)   SpO2 99%   Breastfeeding No   BMI 51.88 kg/m²     Assessment & Plan   Diagnoses and all orders for this visit:    1. Palpitations (Primary)  -     ECG 12 Lead  -     Holter Monitor - 72 Hour Up To 15 Days; Future  -     Adult Transthoracic Echo Complete W/ Cont if Necessary Per Protocol; Future  -     ondansetron (Zofran) 4 MG tablet; Take 1 tablet by mouth Every 8 (Eight) Hours As Needed for Nausea or Vomiting.  Dispense: 12 tablet; Refill: 0  -     UA / M With / Rflx Culture([facility] ONLY) - Urine, Clean Catch  -     CBC & Differential  -     Comprehensive " Metabolic Panel  -     TSH  -     T4, Free    2. Nausea  -     ondansetron (Zofran) 4 MG tablet; Take 1 tablet by mouth Every 8 (Eight) Hours As Needed for Nausea or Vomiting.  Dispense: 12 tablet; Refill: 0  -     UA / M With / Rflx Culture([facility] ONLY) - Urine, Clean Catch  -     CBC & Differential  -     Comprehensive Metabolic Panel  -     TSH  -     T4, Free    Other orders  -     Microscopic Examination -    Discussion:  1. Palpitations  - Will get an EKG today in the office.  - Will arrange an echocardiogram and Holter monitor for 1 week.  - Will obtain labs and a urinalysis    2. Reactive hypoglycemic episodes  - I advised the patient to hold off on restarting her metformin.    3. Nausea  - Will send Zofran    Advised and educated plan of care.      Follow-up:  Return for As Needed - Depending on Test Results - Will Call.    Transcribed from ambient dictation for BRANDY Mcdonnell by Angy Hernandez.  08/26/22   18:36 CDT    Patient verbalized consent to the visit recording.  I have personally performed the services described in this document as transcribed by the above individual, and it is both accurate and complete.  BRANDY Mcdonnell  8/29/2022  15:30 CDT

## 2022-08-27 LAB
ALBUMIN SERPL-MCNC: 4.1 G/DL (ref 3.5–5.2)
ALBUMIN/GLOB SERPL: 1.6 G/DL
ALP SERPL-CCNC: 78 U/L (ref 39–117)
ALT SERPL-CCNC: 20 U/L (ref 1–33)
APPEARANCE UR: ABNORMAL
AST SERPL-CCNC: 19 U/L (ref 1–32)
BACTERIA #/AREA URNS HPF: NORMAL /HPF
BASOPHILS # BLD AUTO: 0.03 10*3/MM3 (ref 0–0.2)
BASOPHILS NFR BLD AUTO: 0.4 % (ref 0–1.5)
BILIRUB SERPL-MCNC: 0.3 MG/DL (ref 0–1.2)
BILIRUB UR QL STRIP: NEGATIVE
BUN SERPL-MCNC: 9 MG/DL (ref 6–20)
BUN/CREAT SERPL: 13 (ref 7–25)
CALCIUM SERPL-MCNC: 9.2 MG/DL (ref 8.6–10.5)
CASTS URNS QL MICRO: NORMAL /LPF
CHLORIDE SERPL-SCNC: 104 MMOL/L (ref 98–107)
CO2 SERPL-SCNC: 26.5 MMOL/L (ref 22–29)
COLOR UR: YELLOW
CREAT SERPL-MCNC: 0.69 MG/DL (ref 0.57–1)
EGFRCR-CYS SERPLBLD CKD-EPI 2021: 111.3 ML/MIN/1.73
EOSINOPHIL # BLD AUTO: 0.09 10*3/MM3 (ref 0–0.4)
EOSINOPHIL NFR BLD AUTO: 1.2 % (ref 0.3–6.2)
EPI CELLS #/AREA URNS HPF: NORMAL /HPF (ref 0–10)
ERYTHROCYTE [DISTWIDTH] IN BLOOD BY AUTOMATED COUNT: 12.5 % (ref 12.3–15.4)
GLOBULIN SER CALC-MCNC: 2.5 GM/DL
GLUCOSE SERPL-MCNC: 94 MG/DL (ref 65–99)
GLUCOSE UR QL STRIP: NEGATIVE
HCT VFR BLD AUTO: 40.6 % (ref 34–46.6)
HGB BLD-MCNC: 13.5 G/DL (ref 12–15.9)
HGB UR QL STRIP: NEGATIVE
IMM GRANULOCYTES # BLD AUTO: 0.03 10*3/MM3 (ref 0–0.05)
IMM GRANULOCYTES NFR BLD AUTO: 0.4 % (ref 0–0.5)
KETONES UR QL STRIP: NEGATIVE
LEUKOCYTE ESTERASE UR QL STRIP: NEGATIVE
LYMPHOCYTES # BLD AUTO: 1.75 10*3/MM3 (ref 0.7–3.1)
LYMPHOCYTES NFR BLD AUTO: 23.2 % (ref 19.6–45.3)
MCH RBC QN AUTO: 28.5 PG (ref 26.6–33)
MCHC RBC AUTO-ENTMCNC: 33.3 G/DL (ref 31.5–35.7)
MCV RBC AUTO: 85.7 FL (ref 79–97)
MICRO URNS: ABNORMAL
MICRO URNS: ABNORMAL
MONOCYTES # BLD AUTO: 0.5 10*3/MM3 (ref 0.1–0.9)
MONOCYTES NFR BLD AUTO: 6.6 % (ref 5–12)
NEUTROPHILS # BLD AUTO: 5.13 10*3/MM3 (ref 1.7–7)
NEUTROPHILS NFR BLD AUTO: 68.2 % (ref 42.7–76)
NITRITE UR QL STRIP: NEGATIVE
NRBC BLD AUTO-RTO: 0 /100 WBC (ref 0–0.2)
PH UR STRIP: 8 [PH] (ref 5–7.5)
PLATELET # BLD AUTO: 330 10*3/MM3 (ref 140–450)
POTASSIUM SERPL-SCNC: 4.5 MMOL/L (ref 3.5–5.2)
PROT SERPL-MCNC: 6.6 G/DL (ref 6–8.5)
PROT UR QL STRIP: NEGATIVE
RBC # BLD AUTO: 4.74 10*6/MM3 (ref 3.77–5.28)
RBC #/AREA URNS HPF: NORMAL /HPF (ref 0–2)
SODIUM SERPL-SCNC: 139 MMOL/L (ref 136–145)
SP GR UR STRIP: 1.02 (ref 1–1.03)
T4 FREE SERPL-MCNC: 1.28 NG/DL (ref 0.93–1.7)
TSH SERPL DL<=0.005 MIU/L-ACNC: 1.04 UIU/ML (ref 0.27–4.2)
URINALYSIS REFLEX: ABNORMAL
UROBILINOGEN UR STRIP-MCNC: 0.2 MG/DL (ref 0.2–1)
WBC # BLD AUTO: 7.53 10*3/MM3 (ref 3.4–10.8)
WBC #/AREA URNS HPF: NORMAL /HPF (ref 0–5)

## 2022-08-29 NOTE — PROGRESS NOTES
Reviewed lab - MyChart message sent.    Electronically signed by BRANDY Mcdonnell, 08/29/22, 8:40 AM CDT.

## 2022-08-31 ENCOUNTER — HOSPITAL ENCOUNTER (OUTPATIENT)
Dept: CARDIOLOGY | Facility: HOSPITAL | Age: 42
Discharge: HOME | End: 2022-08-31
Admitting: NURSE PRACTITIONER

## 2022-08-31 DIAGNOSIS — R00.2 PALPITATIONS: ICD-10-CM

## 2022-08-31 PROCEDURE — 93242 EXT ECG>48HR<7D RECORDING: CPT

## 2022-10-03 LAB
MAXIMAL PREDICTED HEART RATE: 178 BPM
STRESS TARGET HR: 151 BPM

## 2022-10-03 PROCEDURE — 93244 EXT ECG>48HR<7D REV&INTERPJ: CPT | Performed by: INTERNAL MEDICINE

## 2022-10-20 ENCOUNTER — APPOINTMENT (OUTPATIENT)
Dept: CARDIOLOGY | Facility: HOSPITAL | Age: 42
End: 2022-10-20

## 2022-11-09 ENCOUNTER — HOSPITAL ENCOUNTER (OUTPATIENT)
Dept: CT IMAGING | Facility: HOSPITAL | Age: 42
Discharge: HOME | End: 2022-11-09
Admitting: NURSE PRACTITIONER

## 2022-11-09 DIAGNOSIS — R91.1 NODULE OF RIGHT LUNG: ICD-10-CM

## 2022-11-09 PROCEDURE — 71250 CT THORAX DX C-: CPT

## 2022-11-09 NOTE — PROGRESS NOTES
Please call the patient - lung nodule stable - advise would repeat ct chest in 1 year.    Electronically signed by BRANDY Mcdonnell, 11/09/22, 11:42 AM CST.

## 2023-01-17 ENCOUNTER — TELEPHONE (OUTPATIENT)
Dept: FAMILY MEDICINE CLINIC | Facility: CLINIC | Age: 43
End: 2023-01-17

## 2023-01-17 NOTE — TELEPHONE ENCOUNTER
Caller: Anna Means    Relationship: Self    Best call back number: [phone]    What form or medical record are you requesting: PRESCRIPTION    Who is requesting this form or medical record from you: SELF    How would you like to receive the form or medical records (pick-up, mail, fax):     Timeframe paperwork needed: AS SOON AS POSSIBLE    Additional notes: PATIENT IS NEEDING A PRESCRIPTION FOR MASSAGE THERAPY. PLEASE CALL BACK WHEN IT IS READY TO .

## 2023-01-19 DIAGNOSIS — E66.01 CLASS 3 SEVERE OBESITY DUE TO EXCESS CALORIES WITHOUT SERIOUS COMORBIDITY WITH BODY MASS INDEX (BMI) OF 50.0 TO 59.9 IN ADULT: ICD-10-CM

## 2023-01-19 DIAGNOSIS — K76.0 FATTY LIVER: ICD-10-CM

## 2023-01-19 DIAGNOSIS — G43.109 MIGRAINE WITH AURA AND WITHOUT STATUS MIGRAINOSUS, NOT INTRACTABLE: ICD-10-CM

## 2023-01-19 DIAGNOSIS — F41.9 ANXIETY: Primary | ICD-10-CM

## 2023-01-20 LAB
ALBUMIN SERPL-MCNC: 4.1 G/DL (ref 3.5–5.2)
ALBUMIN/GLOB SERPL: 1.3 G/DL
ALP SERPL-CCNC: 77 U/L (ref 39–117)
ALT SERPL-CCNC: 14 U/L (ref 1–33)
AST SERPL-CCNC: 16 U/L (ref 1–32)
BASOPHILS # BLD AUTO: 0.03 10*3/MM3 (ref 0–0.2)
BASOPHILS NFR BLD AUTO: 0.4 % (ref 0–1.5)
BILIRUB SERPL-MCNC: 0.4 MG/DL (ref 0–1.2)
BUN SERPL-MCNC: 8 MG/DL (ref 6–20)
BUN/CREAT SERPL: 11.1 (ref 7–25)
CALCIUM SERPL-MCNC: 9 MG/DL (ref 8.6–10.5)
CHLORIDE SERPL-SCNC: 100 MMOL/L (ref 98–107)
CO2 SERPL-SCNC: 26.4 MMOL/L (ref 22–29)
CREAT SERPL-MCNC: 0.72 MG/DL (ref 0.57–1)
EGFRCR SERPLBLD CKD-EPI 2021: 107.2 ML/MIN/1.73
EOSINOPHIL # BLD AUTO: 0.1 10*3/MM3 (ref 0–0.4)
EOSINOPHIL NFR BLD AUTO: 1.3 % (ref 0.3–6.2)
ERYTHROCYTE [DISTWIDTH] IN BLOOD BY AUTOMATED COUNT: 12.1 % (ref 12.3–15.4)
GLOBULIN SER CALC-MCNC: 3.1 GM/DL
GLUCOSE SERPL-MCNC: 85 MG/DL (ref 65–99)
HCT VFR BLD AUTO: 40.9 % (ref 34–46.6)
HGB BLD-MCNC: 13.5 G/DL (ref 12–15.9)
IMM GRANULOCYTES # BLD AUTO: 0.03 10*3/MM3 (ref 0–0.05)
IMM GRANULOCYTES NFR BLD AUTO: 0.4 % (ref 0–0.5)
LYMPHOCYTES # BLD AUTO: 2.02 10*3/MM3 (ref 0.7–3.1)
LYMPHOCYTES NFR BLD AUTO: 25.3 % (ref 19.6–45.3)
MCH RBC QN AUTO: 28.3 PG (ref 26.6–33)
MCHC RBC AUTO-ENTMCNC: 33 G/DL (ref 31.5–35.7)
MCV RBC AUTO: 85.7 FL (ref 79–97)
MONOCYTES # BLD AUTO: 0.41 10*3/MM3 (ref 0.1–0.9)
MONOCYTES NFR BLD AUTO: 5.1 % (ref 5–12)
NEUTROPHILS # BLD AUTO: 5.39 10*3/MM3 (ref 1.7–7)
NEUTROPHILS NFR BLD AUTO: 67.5 % (ref 42.7–76)
NRBC BLD AUTO-RTO: 0 /100 WBC (ref 0–0.2)
PLATELET # BLD AUTO: 338 10*3/MM3 (ref 140–450)
POTASSIUM SERPL-SCNC: 3.7 MMOL/L (ref 3.5–5.2)
PROT SERPL-MCNC: 7.2 G/DL (ref 6–8.5)
RBC # BLD AUTO: 4.77 10*6/MM3 (ref 3.77–5.28)
SODIUM SERPL-SCNC: 139 MMOL/L (ref 136–145)
T4 FREE SERPL-MCNC: 1.24 NG/DL (ref 0.93–1.7)
TSH SERPL DL<=0.005 MIU/L-ACNC: 1.62 UIU/ML (ref 0.27–4.2)
WBC # BLD AUTO: 7.98 10*3/MM3 (ref 3.4–10.8)

## 2023-01-20 NOTE — PROGRESS NOTES
Reviewed results - [employer] message sent.  If not seen in 3 days (3 day alert set), will send to pool to call the message.      Electronically signed by BRANDY Mcdonnell, 01/20/23, 8:57 AM CST.

## 2023-01-23 ENCOUNTER — TELEPHONE (OUTPATIENT)
Dept: FAMILY MEDICINE CLINIC | Facility: CLINIC | Age: 43
End: 2023-01-23

## 2023-01-23 ENCOUNTER — OFFICE VISIT (OUTPATIENT)
Dept: FAMILY MEDICINE CLINIC | Facility: CLINIC | Age: 43
End: 2023-01-23
Payer: COMMERCIAL

## 2023-01-23 VITALS
HEIGHT: 55 IN | WEIGHT: 207.2 LBS | HEART RATE: 97 BPM | RESPIRATION RATE: 18 BRPM | OXYGEN SATURATION: 98 % | TEMPERATURE: 97.3 F | BODY MASS INDEX: 47.95 KG/M2 | DIASTOLIC BLOOD PRESSURE: 92 MMHG | SYSTOLIC BLOOD PRESSURE: 144 MMHG

## 2023-01-23 DIAGNOSIS — R03.0 TRANSIENT ELEVATED BLOOD PRESSURE: ICD-10-CM

## 2023-01-23 DIAGNOSIS — R20.2 PARESTHESIA: ICD-10-CM

## 2023-01-23 DIAGNOSIS — F41.9 ANXIETY: Primary | ICD-10-CM

## 2023-01-23 DIAGNOSIS — G43.109 MIGRAINE WITH AURA AND WITHOUT STATUS MIGRAINOSUS, NOT INTRACTABLE: ICD-10-CM

## 2023-01-23 DIAGNOSIS — M54.30 CHRONIC SCIATICA, UNSPECIFIED LATERALITY: ICD-10-CM

## 2023-01-23 DIAGNOSIS — J45.20 MILD INTERMITTENT ASTHMA WITHOUT COMPLICATION: ICD-10-CM

## 2023-01-23 DIAGNOSIS — E66.01 CLASS 3 SEVERE OBESITY WITHOUT SERIOUS COMORBIDITY WITH BODY MASS INDEX (BMI) OF 50.0 TO 59.9 IN ADULT, UNSPECIFIED OBESITY TYPE: ICD-10-CM

## 2023-01-23 PROCEDURE — 99214 OFFICE O/P EST MOD 30 MIN: CPT | Performed by: NURSE PRACTITIONER

## 2023-01-23 NOTE — TELEPHONE ENCOUNTER
Patient is wanting to have her labs done at [facility]. They are in for [facility]. Could you please change the order?

## 2023-01-23 NOTE — PROGRESS NOTES
Subjective   Chief Complaint:  Medication checkup    History of Present Illness:  This 42 y.o. female was seen in the office today.      The patient reports overall doing well. She reports she is taking less migraine medication. She denies any flare ups. She reports no asthmatic flare ups. She has Xopenex as needed. She does report chronic sciatica and anxiety. She reports massages help with this. She is requesting, for financial reasons, a prescription for the massage therapy. She presents today with elevated blood pressure. She reports she is able to check it at home and it typically runs better.    The patient reports a spot of numbness in the right lower extremity. She reports it is similar to the spot of numbness in her abdomen, which she was evaluated by a neurologist and found to have no pinched nerves.    Allergies   Allergen Reactions   • Diphenhydramine Unknown - Low Severity   • Gabapentin Delirium   • Topiramate Hallucinations   • Dicyclomine Palpitations and Unknown - High Severity      Current Outpatient Medications on File Prior to Visit   Medication Sig   • busPIRone (BUSPAR) 15 MG tablet Take 0.5 tablets by mouth 2 (Two) Times a Day.   • furosemide (LASIX) 20 MG tablet Take 1 tablet by mouth Daily As Needed (Fluid retention).   • levalbuterol (Xopenex HFA) 45 MCG/ACT inhaler Inhale 2 puffs Every 6 (Six) Hours As Needed for Wheezing.   • ondansetron (Zofran) 4 MG tablet Take 1 tablet by mouth Every 8 (Eight) Hours As Needed for Nausea or Vomiting.   • zonisamide (ZONEGRAN) 25 MG capsule Take 1 capsule by mouth 2 (Two) Times a Day.   • [DISCONTINUED] metFORMIN ER (Glucophage XR) 750 MG 24 hr tablet Take 1 tablet by mouth Daily With Breakfast.     No current facility-administered medications on file prior to visit.      Past Medical, Surgical, Social, and Family History:  Past Medical History:   Diagnosis Date   • Allergic    • Anxiety      No past surgical history on file.  Social History  "    Socioeconomic History   • Marital status:    Tobacco Use   • Smoking status: Former     Types: Cigarettes     Quit date: 2005     Years since quittin.0     No family history on file.    Objective   Physical Exam  Vitals reviewed.   Constitutional:       General: She is not in acute distress.     Appearance: Normal appearance. She is obese.   Cardiovascular:      Rate and Rhythm: Normal rate and regular rhythm.      Comments: Posterior tibial pulses palpable and strong  Pulmonary:      Effort: Pulmonary effort is normal.      Breath sounds: Normal breath sounds.     /92 (BP Location: Right arm, Patient Position: Sitting, Cuff Size: Adult)   Pulse 97   Temp 97.3 °F (36.3 °C) (Infrared)   Resp 18   Ht 133.4 cm (52.5\")   Wt 94 kg (207 lb 3.2 oz)   SpO2 98%   BMI 52.85 kg/m²     Prior Visit Notes/Records, Lab, Imaging, and Diagnostic Results Reviewed:  CBC:  Lab Results - Last 18 Months   Lab Units 23  1402 22  1305 22  0701   WBC 10*3/mm3 7.98 7.53 9.75   HEMOGLOBIN g/dL 13.5 13.5 14.8   HEMATOCRIT % 40.9 40.6 45.0   PLATELETS 10*3/mm3 338 330 337      Chemistry:  Lab Results - Last 18 Months   Lab Units 23  1402 22  1305 22  0701   SODIUM mmol/L 139 139 138   POTASSIUM mmol/L 3.7 4.5 4.4   CHLORIDE mmol/L 100 104 104   TOTAL CO2 mmol/L 26.4 26.5 21.5*   GLUCOSE mg/dL 85 94 110*   BUN mg/dL 8 9 12   CREATININE mg/dL 0.72 0.69 0.76   EGFR RESULT mL/min/1.73 107.2 111.3 100.5   CALCIUM mg/dL 9.0 9.2 9.6     Lab Results - Last 18 Months   Lab Units 23  1402 22  1305 22  0701   ALT (SGPT) U/L 14 20 17   AST (SGOT) U/L 16 19 14   ALK PHOS U/L 77 78 79       Assessment & Plan   Diagnoses and all orders for this visit:    1. Anxiety (Primary)  -     Ambulatory Referral to Massage Therapy  -     TSH; Future  -     T4, Free; Future  -     Comprehensive Metabolic Panel; Future  -     CBC & Differential; Future    2. Class 3 severe obesity " without serious comorbidity with body mass index (BMI) of 50.0 to 59.9 in adult, unspecified obesity type (HCC)  -     TSH; Future  -     T4, Free; Future  -     Comprehensive Metabolic Panel; Future  -     CBC & Differential; Future    3. Mild intermittent asthma without complication    4. Migraine with aura and without status migrainosus, not intractable  -     Ambulatory Referral to Massage Therapy  -     TSH; Future  -     T4, Free; Future  -     Comprehensive Metabolic Panel; Future  -     CBC & Differential; Future    5. Chronic sciatica, unspecified laterality  -     Ambulatory Referral to Massage Therapy  -     TSH; Future  -     T4, Free; Future  -     Comprehensive Metabolic Panel; Future  -     CBC & Differential; Future    6. Transient elevated blood pressure    7. Paresthesia    Discussion:  Advised and educated plan of care.  Massage therapy ordered. Advised lab results done on 01/19/2023. Advised to monitor blood pressures and let me know if it remains elevated. Advised the patient we will proceed with an EMG nerve conduction study for this area of numbness in the leg. Due to insurance reasons, she is wanting to hold off for now. Advised to let me know if things change and she wants to pursue this. Otherwise, advised ER if any loss of function.     Class 3 Severe Obesity (BMI >=40). Obesity-related health conditions include the following: none. Obesity is unchanged. BMI is is above average; BMI management plan is completed. We discussed portion control and increasing exercise.    Follow-up:  Return for Follow-Up with Separate Fasting Lab Visit Prior.    Transcribed from ambient dictation for BRANDY Mcdonnell by Darion Siegel.  01/23/23   15:07 CST    I have personally performed the services described in this document as transcribed by the above individual, and it is both accurate and complete.    Electronically signed by Kvng Keenan, 01/23/23, 3:07 PM CST.

## 2023-03-06 ENCOUNTER — PATIENT MESSAGE (OUTPATIENT)
Dept: FAMILY MEDICINE CLINIC | Facility: CLINIC | Age: 43
End: 2023-03-06
Payer: COMMERCIAL

## 2023-03-06 RX ORDER — MONTELUKAST SODIUM 10 MG/1
10 TABLET ORAL NIGHTLY
Qty: 30 TABLET | Refills: 5 | Status: SHIPPED | OUTPATIENT
Start: 2023-03-06

## 2023-03-06 NOTE — TELEPHONE ENCOUNTER
From: Anna Means  To: Kvng Keenan  Sent: 3/6/2023 3:01 PM CST  Subject: Allergies     Is it possible to get a stronger allergy pill. Over the counter isn't working and my asthma is getting bad using emergency inhaler almost every day.

## 2023-04-25 DIAGNOSIS — F41.9 ANXIETY: ICD-10-CM

## 2023-04-25 RX ORDER — BUSPIRONE HYDROCHLORIDE 15 MG/1
TABLET ORAL
Qty: 30 TABLET | Refills: 5 | Status: SHIPPED | OUTPATIENT
Start: 2023-04-25

## 2023-04-25 NOTE — TELEPHONE ENCOUNTER
Rx Refill Note  Requested Prescriptions     Pending Prescriptions Disp Refills   • busPIRone (BUSPAR) 15 MG tablet [Pharmacy Med Name: BUSPIRONE HCL 15MG TABLET] 30 tablet 5     Sig: TAKE 1/2 TABLET TWICE DAILY GENERIC FOR BUSPAR      Last office visit with prescribing clinician: 1/23/2023      Next office visit with prescribing clinician: 7/25/2023            Whit Dong MA  04/25/23, 13:52 CDT

## 2023-05-12 ENCOUNTER — OFFICE VISIT (OUTPATIENT)
Dept: FAMILY MEDICINE CLINIC | Facility: CLINIC | Age: 43
End: 2023-05-12
Payer: COMMERCIAL

## 2023-05-12 VITALS
SYSTOLIC BLOOD PRESSURE: 152 MMHG | OXYGEN SATURATION: 98 % | HEART RATE: 53 BPM | HEIGHT: 55 IN | TEMPERATURE: 96.9 F | BODY MASS INDEX: 48.14 KG/M2 | DIASTOLIC BLOOD PRESSURE: 88 MMHG | WEIGHT: 208 LBS | RESPIRATION RATE: 18 BRPM

## 2023-05-12 DIAGNOSIS — I10 ESSENTIAL HYPERTENSION: ICD-10-CM

## 2023-05-12 DIAGNOSIS — E66.01 CLASS 3 SEVERE OBESITY DUE TO EXCESS CALORIES WITH SERIOUS COMORBIDITY AND BODY MASS INDEX (BMI) OF 50.0 TO 59.9 IN ADULT: Primary | ICD-10-CM

## 2023-05-12 DIAGNOSIS — R00.2 PALPITATIONS: ICD-10-CM

## 2023-05-12 PROCEDURE — 99213 OFFICE O/P EST LOW 20 MIN: CPT | Performed by: NURSE PRACTITIONER

## 2023-05-12 RX ORDER — SEMAGLUTIDE 2.4 MG/.75ML
2.4 INJECTION, SOLUTION SUBCUTANEOUS WEEKLY
Qty: 3 ML | Refills: 5 | Status: SHIPPED | OUTPATIENT
Start: 2023-05-12

## 2023-05-12 RX ORDER — LOSARTAN POTASSIUM 50 MG/1
50 TABLET ORAL DAILY
Qty: 30 TABLET | Refills: 5 | Status: SHIPPED | OUTPATIENT
Start: 2023-05-12

## 2023-05-12 NOTE — PROGRESS NOTES
Subjective   Chief Complaint:  Weight loss discussion, palpitations    History of Present Illness:  This 43 y.o. female was seen in the office today.  She presents today with concerns with weight gain and palpitations.  Denies any chest pain.  She presents with an elevated blood pressure she had a borderline high blood pressure the last visit.  She does mention that she had a nurse at work check her blood pressure which showed a systolic in the 150 range as well.  She has tried metformin in the past for weight loss with little success and reports stools were loose she really did not tolerate from a GI standpoint.  She reports she has never had pancreatitis and there are a personal or family history of medullary thyroid cancer.    Allergies   Allergen Reactions   • Diphenhydramine Unknown - Low Severity   • Gabapentin Delirium   • Topiramate Hallucinations   • Dicyclomine Palpitations and Unknown - High Severity      Current Outpatient Medications on File Prior to Visit   Medication Sig   • busPIRone (BUSPAR) 15 MG tablet TAKE 1/2 TABLET TWICE DAILY GENERIC FOR BUSPAR   • furosemide (LASIX) 20 MG tablet Take 1 tablet by mouth Daily As Needed (Fluid retention).   • levalbuterol (Xopenex HFA) 45 MCG/ACT inhaler Inhale 2 puffs Every 6 (Six) Hours As Needed for Wheezing.   • montelukast (Singulair) 10 MG tablet Take 1 tablet by mouth Every Night.   • ondansetron (Zofran) 4 MG tablet Take 1 tablet by mouth Every 8 (Eight) Hours As Needed for Nausea or Vomiting.   • zonisamide (ZONEGRAN) 25 MG capsule Take 1 capsule by mouth 2 (Two) Times a Day.     No current facility-administered medications on file prior to visit.      Past Medical, Surgical, Social, and Family History:  Past Medical History:   Diagnosis Date   • Allergic    • Anxiety    • Asthma    • GERD (gastroesophageal reflux disease)    • Irritable bowel syndrome      Past Surgical History:   Procedure Laterality Date   • APPENDECTOMY  3/15/2021   • TUBAL  ABDOMINAL LIGATION       Social History     Socioeconomic History   • Marital status:    Tobacco Use   • Smoking status: Former     Types: Cigarettes     Quit date: 2005     Years since quittin.3   Substance and Sexual Activity   • Sexual activity: Yes     Partners: Male     Birth control/protection: Tubal ligation     Family History   Problem Relation Age of Onset   • Anxiety disorder Mother        Prior Visit Notes/Records, Lab, Imaging, and Diagnostic Results Reviewed:  A1C:  Lab Results - Last 18 Months   Lab Units 22  1141   HEMOGLOBIN A1C % 5.6     GLUCOSE:  Lab Results - Last 18 Months   Lab Units 23  1402 22  1305 22  0701   GLUCOSE mg/dL 85 94 110*     LIPID:  Lab Results - Last 18 Months   Lab Units 22  0701   CHOLESTEROL mg/dL 219*   LDL CHOL mg/dL 136*   HDL CHOL mg/dL 51   TRIGLYCERIDES mg/dL 180*     PSA:No results for input(s): PSA in the last 94856 hours.  CBC:  Lab Results - Last 18 Months   Lab Units 23  1402 22  1305 22  0701   WBC 10*3/mm3 7.98 7.53 9.75   HEMOGLOBIN g/dL 13.5 13.5 14.8   HEMATOCRIT % 40.9 40.6 45.0   PLATELETS 10*3/mm3 338 330 337      BMP/CMP:  Lab Results - Last 18 Months   Lab Units 23  1402 22  1305 22  0701   SODIUM mmol/L 139 139 138   POTASSIUM mmol/L 3.7 4.5 4.4   CHLORIDE mmol/L 100 104 104   TOTAL CO2 mmol/L 26.4 26.5 21.5*   GLUCOSE mg/dL 85 94 110*   BUN mg/dL 8 9 12   CREATININE mg/dL 0.72 0.69 0.76   EGFR RESULT mL/min/1.73 107.2 111.3 100.5   CALCIUM mg/dL 9.0 9.2 9.6     HEPATIC:  Lab Results - Last 18 Months   Lab Units 23  1402 22  1305 22  0701   ALT (SGPT) U/L 14 20 17   AST (SGOT) U/L 16 19 14   ALK PHOS U/L 77 78 79     Vit D:No results for input(s): XENU06GM in the last 92558 hours.  THYROID:  Lab Results - Last 18 Months   Lab Units 23  1402 22  1305 22  0701   TSH uIU/mL 1.620 1.040 1.870   FREE T4 ng/dL 1.24 1.28  --      BMI Trend:  BMI  "Readings from Last 10 Encounters:   05/12/23 53.06 kg/m²   01/23/23 52.85 kg/m²   08/26/22 51.88 kg/m²   08/17/22 52.04 kg/m²   05/31/22 51.32 kg/m²   02/17/22 52.80 kg/m²     Objective   Physical Exam  Vitals reviewed.   Constitutional:       General: She is not in acute distress.     Appearance: Normal appearance. She is obese.   Cardiovascular:      Rate and Rhythm: Normal rate and regular rhythm.   Pulmonary:      Effort: Pulmonary effort is normal.      Breath sounds: Normal breath sounds.     /88 (BP Location: Left arm, Patient Position: Sitting, Cuff Size: Adult)   Pulse 53   Temp 96.9 °F (36.1 °C) (Infrared)   Resp 18   Ht 133.4 cm (52.5\")   Wt 94.3 kg (208 lb)   SpO2 98%   BMI 53.06 kg/m²     Assessment & Plan   Diagnoses and all orders for this visit:    1. Class 3 severe obesity due to excess calories with serious comorbidity and body mass index (BMI) of 50.0 to 59.9 in adult (Primary)  -     Semaglutide-Weight Management 0.25 MG/0.5ML solution auto-injector; Inject 0.25 mg under the skin into the appropriate area as directed 1 (One) Time Per Week.  Dispense: 4 mL; Refill: 0  -     Semaglutide-Weight Management 0.5 MG/0.5ML solution auto-injector; Inject 0.5 mL under the skin into the appropriate area as directed 1 (One) Time Per Week.  Dispense: 2 mL; Refill: 0  -     Semaglutide-Weight Management 1 MG/0.5ML solution auto-injector; Inject 0.5 mL under the skin into the appropriate area as directed 1 (One) Time Per Week.  Dispense: 4 mL; Refill: 0  -     Semaglutide-Weight Management 1.7 MG/0.75ML solution auto-injector; Inject 0.75 mL under the skin into the appropriate area as directed 1 (One) Time Per Week.  Dispense: 3 mL; Refill: 0  -     Semaglutide-Weight Management (Wegovy) 2.4 MG/0.75ML solution auto-injector; Inject 2.4 mg under the skin into the appropriate area as directed 1 (One) Time Per Week.  Dispense: 3 mL; Refill: 5    2. Palpitations    3. Essential hypertension  -     " Hemoglobin A1c  -     Comprehensive Metabolic Panel  -     CBC & Differential  -     Lipid Panel  -     T4, Free  -     TSH    Other orders  -     losartan (COZAAR) 50 MG tablet; Take 1 tablet by mouth Daily.  Dispense: 30 tablet; Refill: 5    Discussion:  Advised and educated plan of care.  We will get updated labs, gave the option to talk to cardiology about the palpitations-she wants to see if possibly managing blood pressure first will help.  She is okay with a short interval follow-up and starting losartan.  She was sampled Weygovy today with instructions of use.  Hopefully her insurance will pick this up and help her.  We also talked about bariatric referral-she wants to hold off on this for now.    Class 3 Severe Obesity (BMI >=40). Obesity-related health conditions include the following: hypertension. Obesity is unchanged. BMI is is above average; BMI management plan is completed. We discussed portion control, increasing exercise and pharmacologic options including glp-1 meds    Follow-up:  Return in about 3 weeks (around 6/2/2023) for BP Follow-up.    Electronically signed by BRANDY Mcdonnell, 05/12/23, 3:17 PM CDT.

## 2023-05-13 LAB
ALBUMIN SERPL-MCNC: 4.3 G/DL (ref 3.8–4.8)
ALBUMIN/GLOB SERPL: 1.4 {RATIO} (ref 1.2–2.2)
ALP SERPL-CCNC: 76 IU/L (ref 44–121)
ALT SERPL-CCNC: 13 IU/L (ref 0–32)
AST SERPL-CCNC: 18 IU/L (ref 0–40)
BASOPHILS # BLD AUTO: 0 X10E3/UL (ref 0–0.2)
BASOPHILS NFR BLD AUTO: 0 %
BILIRUB SERPL-MCNC: <0.2 MG/DL (ref 0–1.2)
BUN SERPL-MCNC: 13 MG/DL (ref 6–24)
BUN/CREAT SERPL: 17 (ref 9–23)
CALCIUM SERPL-MCNC: 8.9 MG/DL (ref 8.7–10.2)
CHLORIDE SERPL-SCNC: 102 MMOL/L (ref 96–106)
CHOLEST SERPL-MCNC: 196 MG/DL (ref 100–199)
CO2 SERPL-SCNC: 22 MMOL/L (ref 20–29)
CREAT SERPL-MCNC: 0.76 MG/DL (ref 0.57–1)
EGFRCR SERPLBLD CKD-EPI 2021: 100 ML/MIN/1.73
EOSINOPHIL # BLD AUTO: 0.2 X10E3/UL (ref 0–0.4)
EOSINOPHIL NFR BLD AUTO: 1 %
ERYTHROCYTE [DISTWIDTH] IN BLOOD BY AUTOMATED COUNT: 12.6 % (ref 11.7–15.4)
GLOBULIN SER CALC-MCNC: 3 G/DL (ref 1.5–4.5)
GLUCOSE SERPL-MCNC: 92 MG/DL (ref 70–99)
HBA1C MFR BLD: 5.4 % (ref 4.8–5.6)
HCT VFR BLD AUTO: 40.5 % (ref 34–46.6)
HDLC SERPL-MCNC: 41 MG/DL
HGB BLD-MCNC: 13.3 G/DL (ref 11.1–15.9)
IMM GRANULOCYTES # BLD AUTO: 0 X10E3/UL (ref 0–0.1)
IMM GRANULOCYTES NFR BLD AUTO: 0 %
LDLC SERPL CALC-MCNC: 111 MG/DL (ref 0–99)
LYMPHOCYTES # BLD AUTO: 2.1 X10E3/UL (ref 0.7–3.1)
LYMPHOCYTES NFR BLD AUTO: 19 %
MCH RBC QN AUTO: 28.5 PG (ref 26.6–33)
MCHC RBC AUTO-ENTMCNC: 32.8 G/DL (ref 31.5–35.7)
MCV RBC AUTO: 87 FL (ref 79–97)
MONOCYTES # BLD AUTO: 0.7 X10E3/UL (ref 0.1–0.9)
MONOCYTES NFR BLD AUTO: 6 %
NEUTROPHILS # BLD AUTO: 8 X10E3/UL (ref 1.4–7)
NEUTROPHILS NFR BLD AUTO: 74 %
PLATELET # BLD AUTO: 307 X10E3/UL (ref 150–450)
POTASSIUM SERPL-SCNC: 4.5 MMOL/L (ref 3.5–5.2)
PROT SERPL-MCNC: 7.3 G/DL (ref 6–8.5)
RBC # BLD AUTO: 4.67 X10E6/UL (ref 3.77–5.28)
SODIUM SERPL-SCNC: 140 MMOL/L (ref 134–144)
T4 FREE SERPL-MCNC: 1.27 NG/DL (ref 0.82–1.77)
TRIGL SERPL-MCNC: 257 MG/DL (ref 0–149)
TSH SERPL DL<=0.005 MIU/L-ACNC: 1.12 UIU/ML (ref 0.45–4.5)
VLDLC SERPL CALC-MCNC: 44 MG/DL (ref 5–40)
WBC # BLD AUTO: 11 X10E3/UL (ref 3.4–10.8)

## 2023-05-15 NOTE — PROGRESS NOTES
Reviewed results - [employer] message sent.  If not seen in 3 days (3 day alert set), will send to pool to call the message.      Electronically signed by BRANDY Mcdonnell, 05/15/23, 9:36 AM CDT.

## 2023-06-02 ENCOUNTER — OFFICE VISIT (OUTPATIENT)
Dept: FAMILY MEDICINE CLINIC | Facility: CLINIC | Age: 43
End: 2023-06-02

## 2023-06-02 VITALS
TEMPERATURE: 98 F | SYSTOLIC BLOOD PRESSURE: 130 MMHG | RESPIRATION RATE: 18 BRPM | HEART RATE: 73 BPM | WEIGHT: 198.4 LBS | OXYGEN SATURATION: 98 % | DIASTOLIC BLOOD PRESSURE: 84 MMHG | HEIGHT: 55 IN | BODY MASS INDEX: 45.91 KG/M2

## 2023-06-02 DIAGNOSIS — I10 ESSENTIAL HYPERTENSION: ICD-10-CM

## 2023-06-02 DIAGNOSIS — E88.81 METABOLIC SYNDROME: Primary | ICD-10-CM

## 2023-06-02 DIAGNOSIS — M79.601 RIGHT ARM PAIN: ICD-10-CM

## 2023-06-02 PROCEDURE — 99214 OFFICE O/P EST MOD 30 MIN: CPT | Performed by: NURSE PRACTITIONER

## 2023-06-02 RX ORDER — SEMAGLUTIDE 1.34 MG/ML
1 INJECTION, SOLUTION SUBCUTANEOUS WEEKLY
Qty: 3 ML | Refills: 5 | Status: SHIPPED | OUTPATIENT
Start: 2023-06-02

## 2023-06-02 RX ORDER — MELOXICAM 15 MG/1
15 TABLET ORAL DAILY
Qty: 30 TABLET | Refills: 0 | Status: SHIPPED | OUTPATIENT
Start: 2023-06-02

## 2023-06-02 NOTE — PROGRESS NOTES
"Subjective   Chief Complaint:  Follow-up on blood pressure    History of Present Illness:  This 43 y.o. female was seen in the office today.      The patient's blood pressure today is 130/84 mmHg, which is improved from 152/88 mmHg. She is currently taking losartan 50 mg, which she takes at night. She reports experiencing palpitations, which were relieved with the losartan.    She reports experiencing pain in her right upper arm, which she initially thought was improving, but recently it has been bothering her. She states she pulled something in 11/2022 while lifting a trash bag and throwing it in a dumpster. She notes the pain has improved, but she continues to go to the gym and does her biceps and triceps exercises which does not affect it. She states she goes to a massage therapist; however, the massage therapist cannot figure out the issue. She reports experiencing pain with rotation of the shoulder. She notes experiencing occasional \"zingers\" when she reaches to turn something. She does not experience constant pain. She reports experiencing pain in her right biceps with abduction.    She has lost 10 pounds since her last visit. She denies any changes to her diet. She reports feeling lara faster.    She is currently taking Wegovy. Her A1c 3 weeks ago was 5.6 percent. She reports her glucose has been over 100 mg/dL, but when she started metformin, it decreaed; however, she believes it is elevated again.    Allergies   Allergen Reactions    Diphenhydramine Unknown - Low Severity    Gabapentin Delirium    Topiramate Hallucinations    Dicyclomine Palpitations and Unknown - High Severity      Current Outpatient Medications on File Prior to Visit   Medication Sig    busPIRone (BUSPAR) 15 MG tablet TAKE 1/2 TABLET TWICE DAILY GENERIC FOR BUSPAR    furosemide (LASIX) 20 MG tablet Take 1 tablet by mouth Daily As Needed (Fluid retention).    levalbuterol (Xopenex HFA) 45 MCG/ACT inhaler Inhale 2 puffs Every 6 (Six) " Hours As Needed for Wheezing.    losartan (COZAAR) 50 MG tablet Take 1 tablet by mouth Daily.    montelukast (Singulair) 10 MG tablet Take 1 tablet by mouth Every Night.    ondansetron (Zofran) 4 MG tablet Take 1 tablet by mouth Every 8 (Eight) Hours As Needed for Nausea or Vomiting.    zonisamide (ZONEGRAN) 25 MG capsule Take 1 capsule by mouth 2 (Two) Times a Day.    [DISCONTINUED] Semaglutide-Weight Management (Wegovy) 2.4 MG/0.75ML solution auto-injector Inject 2.4 mg under the skin into the appropriate area as directed 1 (One) Time Per Week.    [DISCONTINUED] Semaglutide-Weight Management 0.25 MG/0.5ML solution auto-injector Inject 0.25 mg under the skin into the appropriate area as directed 1 (One) Time Per Week.    [DISCONTINUED] Semaglutide-Weight Management 0.5 MG/0.5ML solution auto-injector Inject 0.5 mL under the skin into the appropriate area as directed 1 (One) Time Per Week.    [DISCONTINUED] Semaglutide-Weight Management 1 MG/0.5ML solution auto-injector Inject 0.5 mL under the skin into the appropriate area as directed 1 (One) Time Per Week.    [DISCONTINUED] Semaglutide-Weight Management 1.7 MG/0.75ML solution auto-injector Inject 0.75 mL under the skin into the appropriate area as directed 1 (One) Time Per Week.     No current facility-administered medications on file prior to visit.      Past Medical, Surgical, Social, and Family History:  Past Medical History:   Diagnosis Date    Allergic     Anxiety     Asthma     GERD (gastroesophageal reflux disease)     Irritable bowel syndrome      Past Surgical History:   Procedure Laterality Date    APPENDECTOMY  3/15/2021    TUBAL ABDOMINAL LIGATION       Social History     Socioeconomic History    Marital status:    Tobacco Use    Smoking status: Former     Types: Cigarettes     Quit date: 2005     Years since quittin.4   Substance and Sexual Activity    Sexual activity: Yes     Partners: Male     Birth control/protection: Tubal ligation  "    Family History   Problem Relation Age of Onset    Anxiety disorder Mother      Objective   Physical Exam  Vitals reviewed.   Constitutional:       General: She is not in acute distress.     Appearance: Normal appearance. She is obese.   Cardiovascular:      Rate and Rhythm: Normal rate and regular rhythm.   Pulmonary:      Effort: Pulmonary effort is normal.      Breath sounds: Normal breath sounds.   Musculoskeletal:      Comments: Palpable tenderness right bicep proximal to the shoulder side of it.     /84 (BP Location: Left arm, Patient Position: Sitting, Cuff Size: Adult)   Pulse 73   Temp 98 °F (36.7 °C) (Infrared)   Resp 18   Ht 133.4 cm (52.5\")   Wt 90 kg (198 lb 6.4 oz)   SpO2 98%   BMI 50.61 kg/m²     Prior Visit Notes/Records, Lab, Imaging, and Diagnostic Results Reviewed:  CBC:  Lab Results - Last 18 Months   Lab Units 05/12/23  1410 01/19/23  1402 08/26/22  1305 05/31/22  0701   WBC x10E3/uL 11.0* 7.98 7.53 9.75   HEMOGLOBIN g/dL 13.3 13.5 13.5 14.8   HEMATOCRIT % 40.5 40.9 40.6 45.0   PLATELETS x10E3/uL 307 338 330 337      Chemistry:  Lab Results - Last 18 Months   Lab Units 05/12/23  1410 01/19/23  1402 08/26/22  1305 05/31/22  0701   SODIUM mmol/L 140 139 139 138   POTASSIUM mmol/L 4.5 3.7 4.5 4.4   CHLORIDE mmol/L 102 100 104 104   CO2 mmol/L 22 26.4 26.5 21.5*   GLUCOSE mg/dL 92 85 94 110*   BUN mg/dL 13 8 9 12   CREATININE mg/dL 0.76 0.72 0.69 0.76   EGFR RESULT mL/min/1.73 100 107.2 111.3 100.5   CALCIUM mg/dL 8.9 9.0 9.2 9.6     BMI Trend:  BMI Readings from Last 10 Encounters:   06/02/23 50.61 kg/m²   05/12/23 53.06 kg/m²   01/23/23 52.85 kg/m²   08/26/22 51.88 kg/m²   08/17/22 52.04 kg/m²   05/31/22 51.32 kg/m²   02/17/22 52.80 kg/m²     Assessment & Plan   Diagnoses and all orders for this visit:    1. Metabolic syndrome (Primary)  -     Semaglutide, 1 MG/DOSE, (Ozempic, 1 MG/DOSE,) 4 MG/3ML solution pen-injector; Inject 1 mg under the skin into the appropriate area as " directed 1 (One) Time Per Week.  Dispense: 3 mL; Refill: 5    2. Essential hypertension    3. Right arm pain    Other orders  -     meloxicam (Mobic) 15 MG tablet; Take 1 tablet by mouth Daily.  Dispense: 30 tablet; Refill: 0    Discussion:  Advised and educated plan of care. We are going to start her on Ozempic for metabolic syndrome. Stop Wegovy due to drug cost and lack of reimbursement opportunities from insurance. Follow up in 6 months.    Right bicep pain: Wants to try anti-inflammatories-rest, gentle stretching.  Advised if no better, will likely need MRI to check for muscle tears.    Class 3 Severe Obesity (BMI >=40). Obesity-related health conditions include the following:  fatty liver . Obesity is improving with treatment. BMI is is above average; BMI management plan is completed. We discussed portion control, increasing exercise, and GLP-1 meds .    Follow-up:  Return in about 6 months (around 12/2/2023) for Follow-Up with Separate Fasting Lab Visit Prior.    Transcribed from ambient dictation for BRANDY Mcdonnell by Yordan Sanchez.  06/02/23   09:20 CDT    Patient or patient representative verbalized consent to the visit recording.  I have personally performed the services described in this document as transcribed by the above individual, and it is both accurate and complete.    Electronically signed by Kvng Keenan, 06/02/23, 9:20 AM CDT.

## 2023-07-25 ENCOUNTER — OFFICE VISIT (OUTPATIENT)
Dept: FAMILY MEDICINE CLINIC | Facility: CLINIC | Age: 43
End: 2023-07-25
Payer: COMMERCIAL

## 2023-07-25 VITALS
DIASTOLIC BLOOD PRESSURE: 84 MMHG | HEIGHT: 55 IN | TEMPERATURE: 97.3 F | BODY MASS INDEX: 43.23 KG/M2 | WEIGHT: 186.8 LBS | SYSTOLIC BLOOD PRESSURE: 138 MMHG | OXYGEN SATURATION: 99 % | RESPIRATION RATE: 18 BRPM | HEART RATE: 84 BPM

## 2023-07-25 DIAGNOSIS — E66.01 CLASS 3 SEVERE OBESITY DUE TO EXCESS CALORIES WITHOUT SERIOUS COMORBIDITY WITH BODY MASS INDEX (BMI) OF 45.0 TO 49.9 IN ADULT: Primary | ICD-10-CM

## 2023-07-25 DIAGNOSIS — R91.1 NODULE OF RIGHT LUNG: ICD-10-CM

## 2023-07-25 DIAGNOSIS — M25.511 ACUTE PAIN OF RIGHT SHOULDER: ICD-10-CM

## 2023-07-25 DIAGNOSIS — G43.109 MIGRAINE WITH AURA AND WITHOUT STATUS MIGRAINOSUS, NOT INTRACTABLE: ICD-10-CM

## 2023-07-25 DIAGNOSIS — F41.9 ANXIETY: ICD-10-CM

## 2023-07-25 DIAGNOSIS — I10 ESSENTIAL HYPERTENSION: ICD-10-CM

## 2023-07-25 PROCEDURE — 99214 OFFICE O/P EST MOD 30 MIN: CPT | Performed by: NURSE PRACTITIONER

## 2023-07-25 RX ORDER — DULOXETIN HYDROCHLORIDE 30 MG/1
30 CAPSULE, DELAYED RELEASE ORAL DAILY
Qty: 30 CAPSULE | Refills: 5 | Status: SHIPPED | OUTPATIENT
Start: 2023-07-25

## 2023-07-25 NOTE — PROGRESS NOTES
"Subjective   Chief Complaint:  Medication checkup.    History of Present Illness:  This 43 y.o. female was seen in the office today.      The patient presents today for medication checkup. She has hypertension. She denies any chest pain or palpitations. Blood pressure is satisfactory with Cozaar 50 mg p.o. daily. She was one-tenth of a point away from being prediabetic on the last set of labs. She is on semaglutide to help with weight loss. Her weight has been reduced significantly since her last visit. She reports a month after me ordering an MRI of her right shoulder, she still has not heard back. She reports still having \"zingers\" coming down her arm. She reports continued right shoulder pain and reports meloxicam not helping well. She has prior intolerance to gabapentin due to severe delusions and delirium. She does have a history of anxiety. She reports the BuSpar does not seem to do much.    Allergies   Allergen Reactions    Diphenhydramine Unknown - Low Severity    Gabapentin Delirium    Topiramate Hallucinations    Dicyclomine Palpitations and Unknown - High Severity      Current Outpatient Medications on File Prior to Visit   Medication Sig    busPIRone (BUSPAR) 15 MG tablet TAKE 1/2 TABLET TWICE DAILY GENERIC FOR BUSPAR    furosemide (LASIX) 20 MG tablet Take 1 tablet by mouth Daily As Needed (Fluid retention).    levalbuterol (Xopenex HFA) 45 MCG/ACT inhaler Inhale 2 puffs Every 6 (Six) Hours As Needed for Wheezing.    losartan (COZAAR) 50 MG tablet Take 1 tablet by mouth Daily.    meloxicam (Mobic) 15 MG tablet Take 1 tablet by mouth Daily.    montelukast (Singulair) 10 MG tablet Take 1 tablet by mouth Every Night.    ondansetron (Zofran) 4 MG tablet Take 1 tablet by mouth Every 8 (Eight) Hours As Needed for Nausea or Vomiting.    Semaglutide, 1 MG/DOSE, (Ozempic, 1 MG/DOSE,) 4 MG/3ML solution pen-injector Inject 1 mg under the skin into the appropriate area as directed 1 (One) Time Per Week.    " "zonisamide (ZONEGRAN) 25 MG capsule Take 1 capsule by mouth 2 (Two) Times a Day.     No current facility-administered medications on file prior to visit.      Past Medical, Surgical, Social, and Family History:  Past Medical History:   Diagnosis Date    Allergic     Anxiety     Asthma     GERD (gastroesophageal reflux disease)     Irritable bowel syndrome      Past Surgical History:   Procedure Laterality Date    APPENDECTOMY  3/15/2021    TUBAL ABDOMINAL LIGATION       Social History     Socioeconomic History    Marital status:    Tobacco Use    Smoking status: Former     Types: Cigarettes     Quit date: 2005     Years since quittin.5   Substance and Sexual Activity    Sexual activity: Yes     Partners: Male     Birth control/protection: Tubal ligation     Family History   Problem Relation Age of Onset    Anxiety disorder Mother      Objective   Vital Signs  /84 (BP Location: Left arm, Patient Position: Sitting, Cuff Size: Adult)   Pulse 84   Temp 97.3 °F (36.3 °C) (Infrared)   Resp 18   Ht 133.4 cm (52.5\")   Wt 84.7 kg (186 lb 12.8 oz)   SpO2 99%   BMI 47.65 kg/m²     Physical Exam  Vitals reviewed.   Constitutional:       General: She is not in acute distress.     Appearance: Normal appearance. She is obese.   Cardiovascular:      Rate and Rhythm: Normal rate and regular rhythm.   Pulmonary:      Effort: Pulmonary effort is normal.      Breath sounds: Normal breath sounds.   Musculoskeletal:      Right shoulder: Tenderness present.   Psychiatric:         Mood and Affect: Mood normal.         Behavior: Behavior normal.     Prior Visit Notes/Records, Lab, Imaging, and Diagnostic Results Reviewed:  CBC:  Lab Results - Last 18 Months   Lab Units 23  1410 23  1402 22  1305 22  0701   WBC x10E3/uL 11.0* 7.98 7.53 9.75   HEMOGLOBIN g/dL 13.3 13.5 13.5 14.8   HEMATOCRIT % 40.5 40.9 40.6 45.0   PLATELETS x10E3/uL 307 338 330 337      Chemistry:  Lab Results - Last 18 " Months   Lab Units 05/12/23  1410 01/19/23  1402 08/26/22  1305 05/31/22  0701   SODIUM mmol/L 140 139 139 138   POTASSIUM mmol/L 4.5 3.7 4.5 4.4   CHLORIDE mmol/L 102 100 104 104   CO2 mmol/L 22 26.4 26.5 21.5*   GLUCOSE mg/dL 92 85 94 110*   BUN mg/dL 13 8 9 12   CREATININE mg/dL 0.76 0.72 0.69 0.76   EGFR RESULT mL/min/1.73 100 107.2 111.3 100.5   CALCIUM mg/dL 8.9 9.0 9.2 9.6     BMI Trend:  BMI Readings from Last 10 Encounters:   07/25/23 47.65 kg/m²   06/02/23 50.61 kg/m²   05/12/23 53.06 kg/m²   01/23/23 52.85 kg/m²   08/26/22 51.88 kg/m²   08/17/22 52.04 kg/m²   05/31/22 51.32 kg/m²   02/17/22 52.80 kg/m²     Assessment & Plan   Diagnoses and all orders for this visit:    1. Class 3 severe obesity due to excess calories without serious comorbidity with body mass index (BMI) of 45.0 to 49.9 in adult (Primary)    2. Essential hypertension    3. Migraine with aura and without status migrainosus, not intractable    4. Anxiety    5. Nodule of right lung  -     CT Chest Without Contrast; Future    6. Acute pain of right shoulder  -     Ambulatory Referral to Orthopedic Surgery    Other orders  -     DULoxetine (Cymbalta) 30 MG capsule; Take 1 capsule by mouth Daily.  Dispense: 30 capsule; Refill: 5    Discussion:  Advised and educated plan of care. Advised we will add Cymbalta between now and the next follow-up with hopes that will help with anxiety and nerve pain. She is agreeable to an orthopedic referral. We will have her hold off if she does get a call on the MRI until she sees orthopedics first.    Follow-up:  Return for Next scheduled follow up as already scheduled..    Transcribed from ambient dictation for BRANDY Mcdonnell by Astrid Hernandez.  07/25/23   12:21 CDT    I have personally performed the services described in this document as transcribed by the above individual, and it is both accurate and complete.    Electronically signed by BRANDY Clifford 07/25/23, 12:21 PM CDT.

## 2023-10-27 ENCOUNTER — OFFICE VISIT (OUTPATIENT)
Dept: OBSTETRICS AND GYNECOLOGY | Facility: CLINIC | Age: 43
End: 2023-10-27
Payer: COMMERCIAL

## 2023-10-27 VITALS
DIASTOLIC BLOOD PRESSURE: 82 MMHG | WEIGHT: 176 LBS | HEIGHT: 62 IN | SYSTOLIC BLOOD PRESSURE: 112 MMHG | BODY MASS INDEX: 32.39 KG/M2

## 2023-10-27 DIAGNOSIS — Z12.31 ENCOUNTER FOR SCREENING MAMMOGRAM FOR BREAST CANCER: ICD-10-CM

## 2023-10-27 DIAGNOSIS — N93.8 DUB (DYSFUNCTIONAL UTERINE BLEEDING): ICD-10-CM

## 2023-10-27 DIAGNOSIS — Z88.9 HISTORY OF ALLERGIC REACTION: ICD-10-CM

## 2023-10-27 DIAGNOSIS — N83.201 RIGHT OVARIAN CYST: ICD-10-CM

## 2023-10-27 DIAGNOSIS — Z01.419 WELL WOMAN EXAM WITH ROUTINE GYNECOLOGICAL EXAM: Primary | ICD-10-CM

## 2023-10-27 PROCEDURE — 87798 DETECT AGENT NOS DNA AMP: CPT | Performed by: NURSE PRACTITIONER

## 2023-10-27 PROCEDURE — 87512 GARDNER VAG DNA QUANT: CPT | Performed by: NURSE PRACTITIONER

## 2023-10-27 PROCEDURE — G0123 SCREEN CERV/VAG THIN LAYER: HCPCS | Performed by: NURSE PRACTITIONER

## 2023-10-27 PROCEDURE — 87481 CANDIDA DNA AMP PROBE: CPT | Performed by: NURSE PRACTITIONER

## 2023-10-27 PROCEDURE — 87661 TRICHOMONAS VAGINALIS AMPLIF: CPT | Performed by: NURSE PRACTITIONER

## 2023-10-27 PROCEDURE — 87624 HPV HI-RISK TYP POOLED RSLT: CPT | Performed by: NURSE PRACTITIONER

## 2023-10-27 PROCEDURE — 87563 M. GENITALIUM AMP PROBE: CPT | Performed by: NURSE PRACTITIONER

## 2023-10-27 NOTE — PROGRESS NOTES
"Chief Complaint   Patient presents with    Gynecologic Exam     New patient here for annual, last pap 5/2022 WNL, [place]\", Patient reports that she has been spotting for a month.          Subjective     Anna Means is a 43 y.o. female    History of Present Illness  Pt comes in today for annual wellness exam. Has had off and on spotting this month. Pt reports history of blood clots with periods x 2 years. Periods are irregular where sometimes she will have two and other times she will skip period. No significant pain with periods.       /82 (BP Location: Left arm, Patient Position: Sitting, Cuff Size: Adult)   Ht 157.5 cm (62\")   Wt 79.8 kg (176 lb)   LMP 10/25/2023   BMI 32.19 kg/m²     Outpatient Encounter Medications as of 10/27/2023   Medication Sig Dispense Refill    DULoxetine (Cymbalta) 30 MG capsule Take 1 capsule by mouth Daily. 30 capsule 5    furosemide (LASIX) 20 MG tablet Take 1 tablet by mouth Daily As Needed (Fluid retention). 30 tablet 5    levalbuterol (Xopenex HFA) 45 MCG/ACT inhaler Inhale 2 puffs Every 6 (Six) Hours As Needed for Wheezing. 15 g 5    losartan (COZAAR) 50 MG tablet Take 1 tablet by mouth Daily. 30 tablet 5    montelukast (Singulair) 10 MG tablet Take 1 tablet by mouth Every Night. 30 tablet 5    ondansetron (Zofran) 4 MG tablet Take 1 tablet by mouth Every 8 (Eight) Hours As Needed for Nausea or Vomiting. 12 tablet 0    Semaglutide, 1 MG/DOSE, (Ozempic, 1 MG/DOSE,) 4 MG/3ML solution pen-injector Inject 1 mg under the skin into the appropriate area as directed 1 (One) Time Per Week. 3 mL 5    [DISCONTINUED] busPIRone (BUSPAR) 15 MG tablet TAKE 1/2 TABLET TWICE DAILY GENERIC FOR BUSPAR 30 tablet 5    [DISCONTINUED] zonisamide (ZONEGRAN) 25 MG capsule Take 1 capsule by mouth 2 (Two) Times a Day. 60 capsule 5    [DISCONTINUED] meloxicam (Mobic) 15 MG tablet Take 1 tablet by mouth Daily. 30 tablet 0     No facility-administered encounter " medications on file as of 10/27/2023.       Past Medical History  Past Medical History:   Diagnosis Date    Abnormal Pap smear of cervix     Allergic     Anxiety     Asthma     GERD (gastroesophageal reflux disease)     Hyperlipidemia     Hypertension 2023    Irritable bowel syndrome     Migraine 2019    Ovarian cyst 2021    PONV (postoperative nausea and vomiting)         Surgical History  Past Surgical History:   Procedure Laterality Date    APPENDECTOMY  3/15/2021    CERVICAL BIOPSY  W/ LOOP ELECTRODE EXCISION  2004    TUBAL ABDOMINAL LIGATION      WISDOM TOOTH EXTRACTION         Family History  Family History   Problem Relation Age of Onset    Anxiety disorder Mother     Hypertension Father     Breast cancer Maternal Grandmother     Colon cancer Maternal Grandmother        The following portions of the patient's history were reviewed and updated as appropriate: allergies, current medications, past family history, past medical history, past social history, past surgical history, and problem list.    Review of Systems   Constitutional:  Negative for activity change and unexpected weight loss.   HENT:  Negative for congestion.    Cardiovascular:  Negative for chest pain.   Gastrointestinal:  Negative for blood in stool, constipation and diarrhea.   Endocrine: Negative for cold intolerance and heat intolerance.   Genitourinary:  Positive for menstrual problem. Negative for dyspareunia, pelvic pain and vaginal discharge.   Musculoskeletal:  Negative for arthralgias, back pain, neck pain and neck stiffness.   Skin:  Negative for rash.   Neurological:  Negative for dizziness and headache.   Psychiatric/Behavioral:  Negative for sleep disturbance. The patient is not nervous/anxious.        Objective   Physical Exam  Vitals and nursing note reviewed. Exam conducted with a chaperone present.   Constitutional:       General: She is not in acute distress.     Appearance: She is well-developed. She is not diaphoretic.    HENT:      Head: Normocephalic.      Right Ear: External ear normal.      Left Ear: External ear normal.      Nose: Nose normal.   Eyes:      General: No scleral icterus.        Right eye: No discharge.         Left eye: No discharge.      Conjunctiva/sclera: Conjunctivae normal.      Pupils: Pupils are equal, round, and reactive to light.   Neck:      Thyroid: No thyromegaly.      Vascular: No carotid bruit.      Trachea: No tracheal deviation.   Cardiovascular:      Rate and Rhythm: Normal rate and regular rhythm.      Heart sounds: Normal heart sounds. No murmur heard.  Pulmonary:      Effort: Pulmonary effort is normal. No respiratory distress.      Breath sounds: Normal breath sounds. No wheezing.   Chest:   Breasts:     Breasts are symmetrical.      Right: Normal. No swelling, bleeding, inverted nipple, mass, nipple discharge, skin change or tenderness.      Left: Normal. No swelling, bleeding, inverted nipple, mass, nipple discharge, skin change or tenderness.   Abdominal:      General: There is no distension.      Palpations: Abdomen is soft. There is no mass.      Tenderness: There is no abdominal tenderness. There is no right CVA tenderness, left CVA tenderness or guarding.      Hernia: No hernia is present. There is no hernia in the left inguinal area or right inguinal area.   Genitourinary:     General: Normal vulva.      Exam position: Lithotomy position.      Labia:         Right: No rash, tenderness, lesion or injury.         Left: No rash, tenderness, lesion or injury.       Vagina: Normal. No signs of injury and foreign body. No vaginal discharge, erythema, tenderness or bleeding.      Cervix: Normal.      Uterus: Normal. Not enlarged, not fixed and not tender.       Adnexa: Right adnexa normal and left adnexa normal.        Right: No mass, tenderness or fullness.          Left: No mass, tenderness or fullness.        Rectum: Normal. No mass.      Comments:   BSU normal  Urethral meatus   Normal  Perineum  Normal    Blood of vaginal vault.   Musculoskeletal:         General: No tenderness. Normal range of motion.      Cervical back: Normal range of motion and neck supple.   Lymphadenopathy:      Head:      Right side of head: No submental, submandibular, tonsillar, preauricular, posterior auricular or occipital adenopathy.      Left side of head: No submental, submandibular, tonsillar, preauricular, posterior auricular or occipital adenopathy.      Cervical: No cervical adenopathy.      Right cervical: No superficial, deep or posterior cervical adenopathy.     Left cervical: No superficial, deep or posterior cervical adenopathy.      Upper Body:      Right upper body: No supraclavicular, axillary or pectoral adenopathy.      Left upper body: No supraclavicular, axillary or pectoral adenopathy.      Lower Body: No right inguinal adenopathy. No left inguinal adenopathy.   Skin:     General: Skin is warm and dry.      Findings: No bruising, erythema or rash.   Neurological:      Mental Status: She is alert and oriented to person, place, and time.      Coordination: Coordination normal.   Psychiatric:         Mood and Affect: Mood normal.         Behavior: Behavior normal.         Thought Content: Thought content normal.         Judgment: Judgment normal.       Assessment & Plan   Diagnoses and all orders for this visit:    1. Well woman exam with routine gynecological exam (Primary)  -     Liquid-based Pap Smear, Screening    2. History of allergic reaction  -     Liquid-based Pap Smear, Screening    3. DUB (dysfunctional uterine bleeding)    4. Right ovarian cyst    5. Encounter for screening mammogram for breast cancer  -     Mammo Screening Digital Tomosynthesis Bilateral With CAD; Future         Normal GYN exam. Encouraged SBE, pt is aware how to do self breast exam and the importance of same. Discussed weight management and importance of maintaining a healthy weight. Discussed Vitamin D intake and  the importance of adequate vitamin D for both bone health and a healthy immune system.  Discussed daily exercise and the importance of same, in regards to a healthy heart as well as helping to maintain her weight and improving her mental health.  Colonoscopy is not indicated. Mammogram will be scheduled. Bone density is not indicated. Pap smear is done per ASCCP guidelines. HPV is done. Lab work up is up to date through PCP.      Pt comes in today for annual wellness exam. Complains of spotting over last month. For a few years has had heavier periods that are irregular.     Ultrasound today showed 4.49 right ovarian simple ovarian cyst. Will repeat in 6 weeks.     Discussed hormonal options with pt to help with bleeding, etc. Pt refuses. If she continues to bleed she may be interested in future.     Pt has history of LEEP and abnormal paps since then. Last pap 1 year ago was normal. No HPV completed. This was through PCP.     PT requested BV and yeast panel be added to pap just due to her history of having allergic reaction issues to her . Denies current symptoms.            Andreea Sepulveda, APRN  10/27/2023    Return in about 6 weeks (around 12/8/2023) for Recheck ultrasound and OV for ovarian cyst.

## 2023-11-02 ENCOUNTER — HOSPITAL ENCOUNTER (OUTPATIENT)
Dept: CT IMAGING | Facility: HOSPITAL | Age: 43
Discharge: HOME | End: 2023-11-02
Admitting: NURSE PRACTITIONER
Payer: COMMERCIAL

## 2023-11-02 DIAGNOSIS — R91.1 NODULE OF RIGHT LUNG: ICD-10-CM

## 2023-11-02 LAB
GEN CATEG CVX/VAG CYTO-IMP: NORMAL
HPV I/H RISK 4 DNA CVX QL PROBE+SIG AMP: NOT DETECTED
LAB AP CASE REPORT: NORMAL
LAB AP GYN ADDITIONAL INFORMATION: NORMAL
Lab: NORMAL
PATH INTERP SPEC-IMP: NORMAL
STAT OF ADQ CVX/VAG CYTO-IMP: NORMAL
TRICHOMONAS VAGINALIS PCR: NOT DETECTED

## 2023-11-02 PROCEDURE — 71250 CT THORAX DX C-: CPT

## 2023-11-02 NOTE — PROGRESS NOTES
Reviewed results - uberlifet message sent.  If not seen in 3 days (3 day alert set), will send to pool to call the message.      Electronically signed by BRANDY Mcdonnell, 11/02/23, 8:49 AM CDT.

## 2023-11-08 ENCOUNTER — TELEPHONE (OUTPATIENT)
Dept: FAMILY MEDICINE CLINIC | Facility: CLINIC | Age: 43
End: 2023-11-08

## 2023-11-08 DIAGNOSIS — R05.9 COUGH, UNSPECIFIED TYPE: Primary | ICD-10-CM

## 2023-11-08 RX ORDER — AZITHROMYCIN 250 MG/1
TABLET, FILM COATED ORAL
Qty: 6 TABLET | Refills: 0 | Status: SHIPPED | OUTPATIENT
Start: 2023-11-08 | End: 2023-11-13

## 2023-11-08 RX ORDER — METHYLPREDNISOLONE 4 MG/1
TABLET ORAL
Qty: 1 EACH | Refills: 0 | Status: SHIPPED | OUTPATIENT
Start: 2023-11-08 | End: 2023-12-04

## 2023-11-08 NOTE — TELEPHONE ENCOUNTER
Caller: Anna Means    Relationship: Self    Best call back number: [phone]     What medication are you requesting:     What are your current symptoms: CHEST CONGESTION, WHEEZING, HOARSE VOICE    How long have you been experiencing symptoms: COUPLE OF DAYS    If a prescription is needed, what is your preferred pharmacy and phone number: [id number] - [place], [place] - [address] AT [facility] [address] [facility] [address] - [phone]  - [phone] FX

## 2023-11-08 NOTE — TELEPHONE ENCOUNTER
Caller: Anna Means    Relationship: Self    Best call back number: [phone]     What orders are you requesting (i.e. lab or imaging): COVID TEST    In what timeframe would the patient need to come in: AS SOON AS POSSIBLE    Where will you receive your lab/imaging services: [facility]    Additional notes: PATIENT IS AT WORK AT [facility] AND WOULD LIKE THE ORDER TO BE SENT TO THEM SO SHE CAN GET IT DONE TODAY. PLEASE FAX TO THE -[phone]. PLEASE CALL BACK WHEN THIS HAS BEEN FAXED.

## 2023-11-08 NOTE — TELEPHONE ENCOUNTER
ABT/mdp sent - recommend a home covid test or nurse visit - covid test    Electronically signed by BRANDY Mcdonnell, 11/08/23, 12:04 PM CST.

## 2023-11-27 DIAGNOSIS — I10 ESSENTIAL HYPERTENSION: Primary | ICD-10-CM

## 2023-11-28 LAB
ALBUMIN SERPL-MCNC: 4.3 G/DL (ref 3.5–5.2)
ALBUMIN/GLOB SERPL: 1.8 G/DL
ALP SERPL-CCNC: 70 U/L (ref 39–117)
ALT SERPL-CCNC: 15 U/L (ref 1–33)
AST SERPL-CCNC: 15 U/L (ref 1–32)
BASOPHILS # BLD AUTO: 0.04 10*3/MM3 (ref 0–0.2)
BASOPHILS NFR BLD AUTO: 0.4 % (ref 0–1.5)
BILIRUB SERPL-MCNC: 0.4 MG/DL (ref 0–1.2)
BUN SERPL-MCNC: 11 MG/DL (ref 6–20)
BUN/CREAT SERPL: 17.2 (ref 7–25)
CALCIUM SERPL-MCNC: 10 MG/DL (ref 8.6–10.5)
CHLORIDE SERPL-SCNC: 102 MMOL/L (ref 98–107)
CHOLEST SERPL-MCNC: 195 MG/DL (ref 0–200)
CO2 SERPL-SCNC: 30.6 MMOL/L (ref 22–29)
CREAT SERPL-MCNC: 0.64 MG/DL (ref 0.57–1)
EGFRCR SERPLBLD CKD-EPI 2021: 112.6 ML/MIN/1.73
EOSINOPHIL # BLD AUTO: 0.1 10*3/MM3 (ref 0–0.4)
EOSINOPHIL NFR BLD AUTO: 1 % (ref 0.3–6.2)
ERYTHROCYTE [DISTWIDTH] IN BLOOD BY AUTOMATED COUNT: 11.7 % (ref 12.3–15.4)
GLOBULIN SER CALC-MCNC: 2.4 GM/DL
GLUCOSE SERPL-MCNC: 95 MG/DL (ref 65–99)
HCT VFR BLD AUTO: 42.6 % (ref 34–46.6)
HDLC SERPL-MCNC: 53 MG/DL (ref 40–60)
HGB BLD-MCNC: 13.6 G/DL (ref 12–15.9)
IMM GRANULOCYTES # BLD AUTO: 0.04 10*3/MM3 (ref 0–0.05)
IMM GRANULOCYTES NFR BLD AUTO: 0.4 % (ref 0–0.5)
LDLC SERPL CALC-MCNC: 125 MG/DL (ref 0–100)
LYMPHOCYTES # BLD AUTO: 1.43 10*3/MM3 (ref 0.7–3.1)
LYMPHOCYTES NFR BLD AUTO: 14.2 % (ref 19.6–45.3)
MCH RBC QN AUTO: 28.3 PG (ref 26.6–33)
MCHC RBC AUTO-ENTMCNC: 31.9 G/DL (ref 31.5–35.7)
MCV RBC AUTO: 88.8 FL (ref 79–97)
MONOCYTES # BLD AUTO: 0.51 10*3/MM3 (ref 0.1–0.9)
MONOCYTES NFR BLD AUTO: 5.1 % (ref 5–12)
NEUTROPHILS # BLD AUTO: 7.97 10*3/MM3 (ref 1.7–7)
NEUTROPHILS NFR BLD AUTO: 78.9 % (ref 42.7–76)
NRBC BLD AUTO-RTO: 0 /100 WBC (ref 0–0.2)
PLATELET # BLD AUTO: 361 10*3/MM3 (ref 140–450)
POTASSIUM SERPL-SCNC: 5 MMOL/L (ref 3.5–5.2)
PROT SERPL-MCNC: 6.7 G/DL (ref 6–8.5)
RBC # BLD AUTO: 4.8 10*6/MM3 (ref 3.77–5.28)
SODIUM SERPL-SCNC: 140 MMOL/L (ref 136–145)
TRIGL SERPL-MCNC: 96 MG/DL (ref 0–150)
TSH SERPL DL<=0.005 MIU/L-ACNC: 0.85 UIU/ML (ref 0.27–4.2)
VLDLC SERPL CALC-MCNC: 17 MG/DL (ref 5–40)
WBC # BLD AUTO: 10.09 10*3/MM3 (ref 3.4–10.8)

## 2023-11-28 NOTE — PROGRESS NOTES
Reviewed results - iYogit message sent.  If not seen in 3 days (3 day alert set), will send to pool to call the message.      Electronically signed by BRANDY Mcdonnell, 11/28/23, 1:17 PM CST.

## 2023-12-04 ENCOUNTER — OFFICE VISIT (OUTPATIENT)
Dept: FAMILY MEDICINE CLINIC | Facility: CLINIC | Age: 43
End: 2023-12-04
Payer: COMMERCIAL

## 2023-12-04 VITALS
OXYGEN SATURATION: 96 % | WEIGHT: 180 LBS | HEIGHT: 62 IN | HEART RATE: 85 BPM | BODY MASS INDEX: 33.13 KG/M2 | TEMPERATURE: 96.9 F | DIASTOLIC BLOOD PRESSURE: 76 MMHG | SYSTOLIC BLOOD PRESSURE: 118 MMHG | RESPIRATION RATE: 18 BRPM

## 2023-12-04 DIAGNOSIS — E88.810 METABOLIC SYNDROME: ICD-10-CM

## 2023-12-04 DIAGNOSIS — M54.50 CHRONIC BILATERAL LOW BACK PAIN WITHOUT SCIATICA: ICD-10-CM

## 2023-12-04 DIAGNOSIS — G89.29 CHRONIC BILATERAL LOW BACK PAIN WITHOUT SCIATICA: ICD-10-CM

## 2023-12-04 DIAGNOSIS — K21.9 GASTROESOPHAGEAL REFLUX DISEASE WITHOUT ESOPHAGITIS: ICD-10-CM

## 2023-12-04 DIAGNOSIS — F41.9 ANXIETY: Primary | ICD-10-CM

## 2023-12-04 PROCEDURE — 99214 OFFICE O/P EST MOD 30 MIN: CPT | Performed by: NURSE PRACTITIONER

## 2023-12-04 RX ORDER — LOSARTAN POTASSIUM 50 MG/1
50 TABLET ORAL DAILY
Qty: 30 TABLET | Refills: 5 | Status: SHIPPED | OUTPATIENT
Start: 2023-12-04

## 2023-12-04 RX ORDER — DULOXETIN HYDROCHLORIDE 60 MG/1
60 CAPSULE, DELAYED RELEASE ORAL DAILY
Qty: 30 CAPSULE | Refills: 5 | Status: SHIPPED | OUTPATIENT
Start: 2023-12-04

## 2023-12-04 RX ORDER — SEMAGLUTIDE 1.34 MG/ML
1 INJECTION, SOLUTION SUBCUTANEOUS WEEKLY
Qty: 3 ML | Refills: 5 | Status: SHIPPED | OUTPATIENT
Start: 2023-12-04

## 2023-12-04 RX ORDER — OMEPRAZOLE 40 MG/1
40 CAPSULE, DELAYED RELEASE ORAL DAILY
Qty: 21 CAPSULE | Refills: 0 | Status: SHIPPED | OUTPATIENT
Start: 2023-12-04

## 2023-12-04 NOTE — PROGRESS NOTES
Subjective   Chief Complaint:  Medication checkup.    History of Present Illness:  This 43 y.o. female was seen in the office today.    The patient presents today for a regular medication checkup. She is on Cymbalta for chronic back pain and anxiety. She reports her pain is better, but inquires if she can have a dosage increased to help more with anxiety. She also reports epigastric burning, worse after eating. She has a history of GERD in the past- has been off her PPI. She reports increased belching symptoms.    Allergies   Allergen Reactions    Diphenhydramine Unknown - Low Severity    Gabapentin Delirium    Topiramate Hallucinations    Dicyclomine Palpitations and Unknown - High Severity      Current Outpatient Medications on File Prior to Visit   Medication Sig    furosemide (LASIX) 20 MG tablet Take 1 tablet by mouth Daily As Needed (Fluid retention).    levalbuterol (Xopenex HFA) 45 MCG/ACT inhaler Inhale 2 puffs Every 6 (Six) Hours As Needed for Wheezing.    montelukast (Singulair) 10 MG tablet Take 1 tablet by mouth Every Night.    ondansetron (Zofran) 4 MG tablet Take 1 tablet by mouth Every 8 (Eight) Hours As Needed for Nausea or Vomiting.    Semaglutide, 1 MG/DOSE, (Ozempic, 1 MG/DOSE,) 4 MG/3ML solution pen-injector Inject 1 mg under the skin into the appropriate area as directed 1 (One) Time Per Week.    [DISCONTINUED] DULoxetine (Cymbalta) 30 MG capsule Take 1 capsule by mouth Daily.    [DISCONTINUED] losartan (COZAAR) 50 MG tablet Take 1 tablet by mouth Daily.    [DISCONTINUED] methylPREDNISolone (MEDROL) 4 MG dose pack Take as directed on package instructions.     No current facility-administered medications on file prior to visit.      Past Medical, Surgical, Social, and Family History:  Past Medical History:   Diagnosis Date    Abnormal Pap smear of cervix     Allergic     Anxiety     Asthma     GERD (gastroesophageal reflux disease)     Hyperlipidemia     Hypertension 2023    Irritable bowel  "syndrome     Migraine 2019    Ovarian cyst     PONV (postoperative nausea and vomiting)      Past Surgical History:   Procedure Laterality Date    APPENDECTOMY  3/15/2021    CERVICAL BIOPSY  W/ LOOP ELECTRODE EXCISION  2004    TUBAL ABDOMINAL LIGATION      WISDOM TOOTH EXTRACTION       Social History     Socioeconomic History    Marital status:    Tobacco Use    Smoking status: Former     Types: Cigarettes     Quit date: 2005     Years since quittin.9    Smokeless tobacco: Never   Substance and Sexual Activity    Alcohol use: Yes     Comment: occ    Drug use: Not Currently    Sexual activity: Yes     Partners: Male     Birth control/protection: Tubal ligation     Family History   Problem Relation Age of Onset    Anxiety disorder Mother     Asthma Mother     Depression Mother     Hypertension Father     Breast cancer Maternal Grandmother     Colon cancer Maternal Grandmother        Prior Visit Notes/Records, Lab, Imaging, and Diagnostic Results Reviewed:  A1C:  Lab Results - Last 18 Months   Lab Units 23  1410 22  1141   HEMOGLOBIN A1C % 5.4 5.6     GLUCOSE:  Lab Results - Last 18 Months   Lab Units 23  0842 23  1410 23  1402 22  1305   GLUCOSE mg/dL 95 92 85 94     LIPID:  Lab Results - Last 18 Months   Lab Units 23  0842 23  1410   CHOLESTEROL mg/dL 195 196   LDL CHOL mg/dL 125* 111*   HDL CHOL mg/dL 53 41   TRIGLYCERIDES mg/dL 96 257*     PSA:No results for input(s): \"PSA\" in the last 85055 hours.  CBC:  Lab Results - Last 18 Months   Lab Units 23  0842 23  1410 23  1402 22  1305   WBC 10*3/mm3 10.09 11.0* 7.98 7.53   HEMOGLOBIN g/dL 13.6 13.3 13.5 13.5   HEMATOCRIT % 42.6 40.5 40.9 40.6   PLATELETS 10*3/mm3 361 307 338 330      BMP/CMP:  Lab Results - Last 18 Months   Lab Units 23  0842 23  1410 23  1402 22  1305   SODIUM mmol/L 140 140 139 139   POTASSIUM mmol/L 5.0 4.5 3.7 4.5   CHLORIDE mmol/L " "102 102 100 104   CO2 mmol/L 30.6* 22 26.4 26.5   GLUCOSE mg/dL 95 92 85 94   BUN mg/dL 11 13 8 9   CREATININE mg/dL 0.64 0.76 0.72 0.69   EGFR RESULT mL/min/1.73 112.6 100 107.2 111.3   CALCIUM mg/dL 10.0 8.9 9.0 9.2     HEPATIC:  Lab Results - Last 18 Months   Lab Units 11/27/23  0842 05/12/23  1410 01/19/23  1402 08/26/22  1305   ALT (SGPT) U/L 15 13 14 20   AST (SGOT) U/L 15 18 16 19   ALK PHOS U/L 70 76 77 78     Vit D:No results for input(s): \"RRMU67PS\" in the last 92211 hours.  THYROID:  Lab Results - Last 18 Months   Lab Units 11/27/23  0842 05/12/23  1410 01/19/23  1402 08/26/22  1305   TSH uIU/mL 0.847 1.120 1.620 1.040   FREE T4 ng/dL  --  1.27 1.24 1.28     BMI Trend:  BMI Readings from Last 10 Encounters:   12/04/23 32.92 kg/m²   10/27/23 32.19 kg/m²   07/25/23 47.65 kg/m²   06/02/23 50.61 kg/m²   05/12/23 53.06 kg/m²   01/23/23 52.85 kg/m²   08/26/22 51.88 kg/m²   08/17/22 52.04 kg/m²   05/31/22 51.32 kg/m²   02/17/22 52.80 kg/m²     Objective   Vital Signs  /76 (BP Location: Right arm, Patient Position: Sitting, Cuff Size: Large Adult)   Pulse 85   Temp 96.9 °F (36.1 °C) (Infrared)   Resp 18   Ht 157.5 cm (62\")   Wt 81.6 kg (180 lb)   SpO2 96%   BMI 32.92 kg/m²   Physical Exam  Vitals reviewed.   Constitutional:       General: She is not in acute distress.     Appearance: Normal appearance. She is obese.   Cardiovascular:      Rate and Rhythm: Normal rate and regular rhythm.   Pulmonary:      Effort: Pulmonary effort is normal.      Breath sounds: Normal breath sounds.   Abdominal:      Palpations: Abdomen is soft.      Tenderness: There is no abdominal tenderness.     Assessment & Plan   Diagnoses and all orders for this visit:    1. Anxiety (Primary)    2. Chronic bilateral low back pain without sciatica    3. Gastroesophageal reflux disease without esophagitis  -     H. Pylori Breath Test - Breath, Lung    Other orders  -     DULoxetine (CYMBALTA) 60 MG capsule; Take 1 capsule by mouth " Daily.  Dispense: 30 capsule; Refill: 5  -     omeprazole (priLOSEC) 40 MG capsule; Take 1 capsule by mouth Daily.  Dispense: 21 capsule; Refill: 0    Discussions & Anticipatory Guidance:  Advised and educated plan of care.    The patient will Return for Follow-Up with Separate Fasting Lab Visit Prior.  Advised recent lab results. We will titrate Cymbalta. We will do 3 weeks of Prilosec-I advised if no better a week from now to let me know and we will consider GI referral. She will complete a H. pylori breath test at the lab today.    I have personally performed the services described in this document as transcribed by the above individual, and it is both accurate and complete.    Transcribed from ambient dictation for BRANDY Mcdonnell by Astrid Hernandez.  12/04/23   15:47 CST    Electronically signed by BRANDY Clifford 12/04/23, 3:47 PM CST.

## 2023-12-05 LAB — UREA BREATH TEST QL: NEGATIVE

## 2023-12-05 NOTE — PROGRESS NOTES
Reviewed results - Levert message sent.  If not seen in 3 days (3 day alert set), will send to pool to call the message.      Electronically signed by BRANDY Mcdonnell, 12/05/23, 2:12 PM CST.

## 2023-12-08 ENCOUNTER — OFFICE VISIT (OUTPATIENT)
Dept: OBSTETRICS AND GYNECOLOGY | Facility: CLINIC | Age: 43
End: 2023-12-08
Payer: COMMERCIAL

## 2023-12-08 VITALS
WEIGHT: 177 LBS | DIASTOLIC BLOOD PRESSURE: 82 MMHG | BODY MASS INDEX: 32.57 KG/M2 | SYSTOLIC BLOOD PRESSURE: 130 MMHG | HEIGHT: 62 IN

## 2023-12-08 DIAGNOSIS — N83.201 RIGHT OVARIAN CYST: Primary | ICD-10-CM

## 2023-12-08 PROCEDURE — 99213 OFFICE O/P EST LOW 20 MIN: CPT | Performed by: NURSE PRACTITIONER

## 2023-12-08 NOTE — PROGRESS NOTES
Chief Complaint   Patient presents with    Ovarian Cyst     Patient here following up on ovarian cyst. Patient had u/s in office today. Patient denies any new problems or concerns.        History:  Anna Means is a 43 y.o. female who presents today for follow-up for evaluation of the above:  Pt comes in today for follow up on ovarian cyst. Denies any symptoms.         ROS:  Review of Systems   Constitutional:  Negative for activity change and unexpected weight loss.   HENT:  Negative for congestion.    Cardiovascular:  Negative for chest pain.   Gastrointestinal:  Negative for blood in stool, constipation and diarrhea.   Endocrine: Negative for cold intolerance and heat intolerance.   Genitourinary:  Negative for dyspareunia, pelvic pain and vaginal discharge.   Musculoskeletal:  Negative for arthralgias, back pain, neck pain and neck stiffness.   Skin:  Negative for rash.   Neurological:  Negative for dizziness and headache.   Psychiatric/Behavioral:  Negative for sleep disturbance. The patient is not nervous/anxious.        Ms. Means  reports that she quit smoking about 18 years ago. Her smoking use included cigarettes. She has never used smokeless tobacco. She reports current alcohol use. She reports that she does not currently use drugs.      Current Outpatient Medications:     DULoxetine (CYMBALTA) 60 MG capsule, Take 1 capsule by mouth Daily., Disp: 30 capsule, Rfl: 5    furosemide (LASIX) 20 MG tablet, Take 1 tablet by mouth Daily As Needed (Fluid retention)., Disp: 30 tablet, Rfl: 5    levalbuterol (Xopenex HFA) 45 MCG/ACT inhaler, Inhale 2 puffs Every 6 (Six) Hours As Needed for Wheezing., Disp: 15 g, Rfl: 5    losartan (COZAAR) 50 MG tablet, TAKE 1 TABLET BY MOUTH DAILY., Disp: 30 tablet, Rfl: 5    montelukast (Singulair) 10 MG tablet, Take 1 tablet by mouth Every Night., Disp: 30 tablet, Rfl: 5    omeprazole (priLOSEC) 40 MG capsule, Take 1 capsule by mouth Daily., Disp: 21 capsule, Rfl: 0     "ondansetron (Zofran) 4 MG tablet, Take 1 tablet by mouth Every 8 (Eight) Hours As Needed for Nausea or Vomiting., Disp: 12 tablet, Rfl: 0    Semaglutide, 1 MG/DOSE, (Ozempic, 1 MG/DOSE,) 4 MG/3ML solution pen-injector, Inject 1 mg under the skin into the appropriate area as directed 1 (One) Time Per Week., Disp: 3 mL, Rfl: 5      OBJECTIVE:  /82 (BP Location: Left arm, Patient Position: Sitting, Cuff Size: Adult)   Ht 157.5 cm (62\")   Wt 80.3 kg (177 lb)   LMP 11/25/2023   BMI 32.37 kg/m²    Physical Exam  Vitals and nursing note reviewed.   Constitutional:       Appearance: She is well-developed.   HENT:      Head: Normocephalic and atraumatic.   Eyes:      General:         Right eye: No discharge.         Left eye: No discharge.      Conjunctiva/sclera: Conjunctivae normal.   Neck:      Thyroid: No thyromegaly.   Cardiovascular:      Rate and Rhythm: Normal rate and regular rhythm.      Heart sounds: Normal heart sounds. No murmur heard.  Pulmonary:      Effort: Pulmonary effort is normal.      Breath sounds: Normal breath sounds.   Musculoskeletal:      Cervical back: Normal range of motion and neck supple.   Skin:     General: Skin is warm and dry.   Neurological:      Mental Status: She is alert and oriented to person, place, and time.   Psychiatric:         Mood and Affect: Mood normal.         Behavior: Behavior normal.         Thought Content: Thought content normal.         Judgment: Judgment normal.         Assessment/Plan    Diagnoses and all orders for this visit:    1. Right ovarian cyst (Primary)    Pt following up on ovarian cyst. Ultrasound today shows stable appearing simple right ovarian cyst. Pt continues to have no complaints. Will continue to monitor. Pt to return sooner with worsening symptoms.        An After Visit Summary was printed and given to the patient at discharge.  Return in about 6 months (around 6/8/2024) for ov and ultrasound for ovarian cyst. Sooner if problems arise.    "       BRANDY Grissom  Electronically Signed

## 2024-01-11 ENCOUNTER — PATIENT MESSAGE (OUTPATIENT)
Dept: FAMILY MEDICINE CLINIC | Facility: CLINIC | Age: 44
End: 2024-01-11
Payer: COMMERCIAL

## 2024-01-11 DIAGNOSIS — Z88.9 H/O MULTIPLE ALLERGIES: Primary | ICD-10-CM

## 2024-01-12 NOTE — TELEPHONE ENCOUNTER
From: Anna Means  To: Kvng Keenan  Sent: 1/11/2024 9:18 PM CST  Subject: Referral     Can you please send a referral over to [facility]. I have been having some allergic reactions. From scents and I have been having smell and digestive allergy to cinnamon.

## 2024-03-14 ENCOUNTER — OFFICE VISIT (OUTPATIENT)
Dept: FAMILY MEDICINE CLINIC | Facility: CLINIC | Age: 44
End: 2024-03-14
Payer: COMMERCIAL

## 2024-03-14 VITALS
HEIGHT: 62 IN | SYSTOLIC BLOOD PRESSURE: 120 MMHG | HEART RATE: 77 BPM | OXYGEN SATURATION: 98 % | DIASTOLIC BLOOD PRESSURE: 84 MMHG | BODY MASS INDEX: 34.27 KG/M2 | WEIGHT: 186.2 LBS | RESPIRATION RATE: 18 BRPM | TEMPERATURE: 97.1 F

## 2024-03-14 DIAGNOSIS — H10.023 PINK EYE DISEASE OF BOTH EYES: Primary | ICD-10-CM

## 2024-03-14 DIAGNOSIS — R59.9 ADENOPATHY: ICD-10-CM

## 2024-03-14 DIAGNOSIS — J02.9 ACUTE PHARYNGITIS, UNSPECIFIED ETIOLOGY: ICD-10-CM

## 2024-03-14 PROCEDURE — 99213 OFFICE O/P EST LOW 20 MIN: CPT | Performed by: NURSE PRACTITIONER

## 2024-03-14 RX ORDER — EPINEPHRINE 0.3 MG/.3ML
INJECTION SUBCUTANEOUS
COMMUNITY
Start: 2024-01-18

## 2024-03-14 RX ORDER — AMOXICILLIN AND CLAVULANATE POTASSIUM 875; 125 MG/1; MG/1
1 TABLET, FILM COATED ORAL 2 TIMES DAILY
Qty: 20 TABLET | Refills: 0 | Status: SHIPPED | OUTPATIENT
Start: 2024-03-14 | End: 2024-03-24

## 2024-03-14 RX ORDER — AZELASTINE 1 MG/ML
SPRAY, METERED NASAL
COMMUNITY
Start: 2024-01-17

## 2024-03-14 RX ORDER — DILTIAZEM HYDROCHLORIDE 60 MG/1
TABLET, FILM COATED ORAL
COMMUNITY
Start: 2024-01-17

## 2024-03-14 RX ORDER — MONTELUKAST SODIUM 10 MG/1
TABLET ORAL
Qty: 30 TABLET | Refills: 5 | Status: SHIPPED | OUTPATIENT
Start: 2024-03-14

## 2024-03-14 RX ORDER — FLUCONAZOLE 150 MG/1
150 TABLET ORAL
Qty: 3 TABLET | Refills: 0 | Status: SHIPPED | OUTPATIENT
Start: 2024-03-14

## 2024-03-14 RX ORDER — CIPROFLOXACIN HYDROCHLORIDE 3.5 MG/ML
1 SOLUTION/ DROPS TOPICAL 4 TIMES DAILY
Qty: 2.5 ML | Refills: 0 | Status: SHIPPED | OUTPATIENT
Start: 2024-03-14

## 2024-03-14 RX ORDER — FLUTICASONE PROPIONATE 50 MCG
SPRAY, SUSPENSION (ML) NASAL
COMMUNITY
Start: 2024-01-17

## 2024-03-14 NOTE — PROGRESS NOTES
Subjective   Chief Complaint:  Possible pinkeye.     History of Present Illness:  This 44 y.o. female was seen in the office today. She presents today with possible pinkeye on the left. She reports bilateral ear pain. Symptoms have been present for 1 week.    Review of Systems    Allergies   Allergen Reactions    Diphenhydramine Unknown - Low Severity    Gabapentin Delirium    Topiramate Hallucinations    Dicyclomine Palpitations and Unknown - High Severity      Current Outpatient Medications on File Prior to Visit   Medication Sig    azelastine (ASTELIN) 0.1 % nasal spray     DULoxetine (CYMBALTA) 60 MG capsule Take 1 capsule by mouth Daily.    EPINEPHrine (EPIPEN) 0.3 MG/0.3ML solution auto-injector injection     fluticasone (FLONASE) 50 MCG/ACT nasal spray     furosemide (LASIX) 20 MG tablet Take 1 tablet by mouth Daily As Needed (Fluid retention).    levalbuterol (Xopenex HFA) 45 MCG/ACT inhaler Inhale 2 puffs Every 6 (Six) Hours As Needed for Wheezing.    losartan (COZAAR) 50 MG tablet TAKE 1 TABLET BY MOUTH DAILY.    ondansetron (Zofran) 4 MG tablet Take 1 tablet by mouth Every 8 (Eight) Hours As Needed for Nausea or Vomiting.    Semaglutide, 1 MG/DOSE, (Ozempic, 1 MG/DOSE,) 4 MG/3ML solution pen-injector Inject 1 mg under the skin into the appropriate area as directed 1 (One) Time Per Week.    Symbicort 80-4.5 MCG/ACT inhaler     omeprazole (priLOSEC) 40 MG capsule Take 1 capsule by mouth Daily. (Patient not taking: Reported on 3/14/2024)    [DISCONTINUED] montelukast (Singulair) 10 MG tablet Take 1 tablet by mouth Every Night.     No current facility-administered medications on file prior to visit.      Past Medical, Surgical, Social, and Family History:  Past Medical History:   Diagnosis Date    Abnormal Pap smear of cervix     Allergic     Anxiety     Asthma     GERD (gastroesophageal reflux disease)     Hyperlipidemia     Hypertension 2023    Irritable bowel syndrome     Migraine 2019    Ovarian cyst 2021  "   PONV (postoperative nausea and vomiting)      Past Surgical History:   Procedure Laterality Date    APPENDECTOMY  3/15/2021    CERVICAL BIOPSY  W/ LOOP ELECTRODE EXCISION      TUBAL ABDOMINAL LIGATION      WISDOM TOOTH EXTRACTION       Social History     Socioeconomic History    Marital status:    Tobacco Use    Smoking status: Former     Current packs/day: 0.00     Types: Cigarettes     Quit date: 2005     Years since quittin.2     Passive exposure: Past    Smokeless tobacco: Never   Substance and Sexual Activity    Alcohol use: Yes     Comment: occ    Drug use: Not Currently    Sexual activity: Yes     Partners: Male     Birth control/protection: Tubal ligation     Family History   Problem Relation Age of Onset    Anxiety disorder Mother     Asthma Mother     Depression Mother     Hypertension Father     Breast cancer Maternal Grandmother     Colon cancer Maternal Grandmother        Prior Visit Notes/Records, Lab, Imaging, and Diagnostic Results Reviewed:  A1C:  Lab Results - Last 18 Months   Lab Units 23  1410   HEMOGLOBIN A1C % 5.4     GLUCOSE:  Lab Results - Last 18 Months   Lab Units 23  0842 23  1410 23  1402   GLUCOSE mg/dL 95 92 85     LIPID:  Lab Results - Last 18 Months   Lab Units 23  0842 23  1410   CHOLESTEROL mg/dL 195 196   LDL CHOL mg/dL 125* 111*   HDL CHOL mg/dL 53 41   TRIGLYCERIDES mg/dL 96 257*     PSA:No results for input(s): \"PSA\" in the last 91994 hours.  CBC:  Lab Results - Last 18 Months   Lab Units 23  0842 23  1410 23  1402   WBC 10*3/mm3 10.09 11.0* 7.98   HEMOGLOBIN g/dL 13.6 13.3 13.5   HEMATOCRIT % 42.6 40.5 40.9   PLATELETS 10*3/mm3 361 307 338      BMP/CMP:  Lab Results - Last 18 Months   Lab Units 23  0842 23  1410 23  1402   SODIUM mmol/L 140 140 139   POTASSIUM mmol/L 5.0 4.5 3.7   CHLORIDE mmol/L 102 102 100   CO2 mmol/L 30.6* 22 26.4   GLUCOSE mg/dL 95 92 85   BUN mg/dL 11 13 8 " "  CREATININE mg/dL 0.64 0.76 0.72   EGFR RESULT mL/min/1.73 112.6 100 107.2   CALCIUM mg/dL 10.0 8.9 9.0     HEPATIC:  Lab Results - Last 18 Months   Lab Units 11/27/23  0842 05/12/23  1410 01/19/23  1402   ALT (SGPT) U/L 15 13 14   AST (SGOT) U/L 15 18 16   ALK PHOS U/L 70 76 77     Vit D:No results for input(s): \"OQAA89JV\" in the last 95696 hours.  THYROID:  Lab Results - Last 18 Months   Lab Units 11/27/23  0842 05/12/23  1410 01/19/23  1402   TSH uIU/mL 0.847 1.120 1.620   FREE T4 ng/dL  --  1.27 1.24     BMI Trend:  BMI Readings from Last 10 Encounters:   03/14/24 34.06 kg/m²   12/08/23 32.37 kg/m²   12/04/23 32.92 kg/m²   10/27/23 32.19 kg/m²   07/25/23 47.65 kg/m²   06/02/23 50.61 kg/m²   05/12/23 53.06 kg/m²   01/23/23 52.85 kg/m²   08/26/22 51.88 kg/m²   08/17/22 52.04 kg/m²     Objective   Vital Signs  /84 (BP Location: Left arm, Patient Position: Sitting, Cuff Size: Large Adult)   Pulse 77   Temp 97.1 °F (36.2 °C) (Infrared)   Resp 18   Ht 157.5 cm (62\")   Wt 84.5 kg (186 lb 3.2 oz)   SpO2 98%   BMI 34.06 kg/m²   Physical Exam  Vitals reviewed.   Constitutional:       General: She is not in acute distress.     Appearance: Normal appearance. She is obese.   HENT:      Mouth/Throat:      Comments: Throat red, erythematous.  Eyes:      Comments: Pink eye present and left eye with scleral redness.    Neck:      Comments: Tonsillar enlargement on the left with associated anterior adenopathy.   Cardiovascular:      Rate and Rhythm: Normal rate and regular rhythm.   Pulmonary:      Effort: Pulmonary effort is normal.      Breath sounds: Normal breath sounds.       Assessment & Plan   Diagnoses and all orders for this visit:    1. Pink eye disease of both eyes (Primary)    2. Adenopathy    3. Acute pharyngitis, unspecified etiology    Other orders  -     amoxicillin-clavulanate (AUGMENTIN) 875-125 MG per tablet; Take 1 tablet by mouth 2 (Two) Times a Day for 10 days.  Dispense: 20 tablet; Refill: " 0  -     ciprofloxacin (Ciloxan) 0.3 % ophthalmic solution; Administer 1 drop to both eyes 4 (Four) Times a Day.  Dispense: 2.5 mL; Refill: 0  -     fluconazole (DIFLUCAN) 150 MG tablet; Take 1 tablet by mouth Every 3 (Three) Days.  Dispense: 3 tablet; Refill: 0    Discussions & Anticipatory Guidance:  Advised and educated plan of care.    The patient will Return for follow-up as needed.   Avised antibiotics, oral and topical to follow.    Electronically signed by Kvng Keenan, 03/14/24, 10:28 AM CDT.

## 2024-03-20 DIAGNOSIS — E88.810 METABOLIC SYNDROME: ICD-10-CM

## 2024-03-20 RX ORDER — SEMAGLUTIDE 1.34 MG/ML
INJECTION, SOLUTION SUBCUTANEOUS
Qty: 9 ML | Refills: 0 | Status: SHIPPED | OUTPATIENT
Start: 2024-03-20

## 2024-03-20 NOTE — TELEPHONE ENCOUNTER
Rx Refill Note  Requested Prescriptions     Pending Prescriptions Disp Refills    Ozempic, 1 MG/DOSE, 4 MG/3ML solution pen-injector [Pharmacy Med Name: OZEMPIC 1 MG/DOSE (4 MG/3 ML)] 9 mL 0     Sig: INJECT 1MG UNDER THE SKIN INTO THE APPROPRIATE AREA AS DIRECTED ONCE PER WEEK      Last office visit with office: 03/14/2024  Next office visit with office: none    UDS: none    DATE OF LAST REFILL: 12/04/23    Controlled Substance Agreement: not up to date    ABIODUN OR SARMAD: N/A         {TIP  Is Refill Pharmacy correct?:yes  Allegra Michael MA  03/20/24, 15:03 CDT

## 2024-05-06 ENCOUNTER — PATIENT MESSAGE (OUTPATIENT)
Dept: FAMILY MEDICINE CLINIC | Facility: CLINIC | Age: 44
End: 2024-05-06
Payer: COMMERCIAL

## 2024-05-07 RX ORDER — METHYLPREDNISOLONE 4 MG/1
TABLET ORAL
Qty: 1 EACH | Refills: 0 | Status: SHIPPED | OUTPATIENT
Start: 2024-05-07 | End: 2024-05-13

## 2024-05-07 RX ORDER — AZITHROMYCIN 250 MG/1
TABLET, FILM COATED ORAL
Qty: 6 TABLET | Refills: 0 | Status: SHIPPED | OUTPATIENT
Start: 2024-05-07 | End: 2024-05-12

## 2024-05-13 ENCOUNTER — OFFICE VISIT (OUTPATIENT)
Dept: FAMILY MEDICINE CLINIC | Facility: CLINIC | Age: 44
End: 2024-05-13
Payer: COMMERCIAL

## 2024-05-13 VITALS
BODY MASS INDEX: 33.93 KG/M2 | TEMPERATURE: 97.1 F | RESPIRATION RATE: 18 BRPM | HEIGHT: 62 IN | WEIGHT: 184.4 LBS | HEART RATE: 79 BPM | SYSTOLIC BLOOD PRESSURE: 120 MMHG | OXYGEN SATURATION: 99 % | DIASTOLIC BLOOD PRESSURE: 90 MMHG

## 2024-05-13 DIAGNOSIS — J01.90 ACUTE NON-RECURRENT SINUSITIS, UNSPECIFIED LOCATION: ICD-10-CM

## 2024-05-13 DIAGNOSIS — R23.3 EASY BRUISING: Primary | ICD-10-CM

## 2024-05-13 PROCEDURE — 99213 OFFICE O/P EST LOW 20 MIN: CPT | Performed by: NURSE PRACTITIONER

## 2024-05-13 RX ORDER — CLINDAMYCIN HYDROCHLORIDE 300 MG/1
300 CAPSULE ORAL 3 TIMES DAILY
Qty: 30 CAPSULE | Refills: 0 | Status: SHIPPED | OUTPATIENT
Start: 2024-05-13 | End: 2024-05-23

## 2024-05-13 NOTE — PROGRESS NOTES
Subjective   Chief Complaint:  Right ear pain    History of Present Illness:  This 44 y.o. female was seen in the office today.  She presents today for right ear pain-she finished a Z-Wayne and no better.  She completed a Z-Wayne and Medrol Dosepak starting 2024    Past Medical, Surgical, Social, and Family History:  Allergies   Allergen Reactions    Diphenhydramine Unknown - Low Severity    Gabapentin Delirium    Topiramate Hallucinations    Dicyclomine Palpitations and Unknown - High Severity      Current Outpatient Medications on File Prior to Visit   Medication Sig    azelastine (ASTELIN) 0.1 % nasal spray     DULoxetine (CYMBALTA) 60 MG capsule Take 1 capsule by mouth Daily.    EPINEPHrine (EPIPEN) 0.3 MG/0.3ML solution auto-injector injection     fluticasone (FLONASE) 50 MCG/ACT nasal spray     furosemide (LASIX) 20 MG tablet Take 1 tablet by mouth Daily As Needed (Fluid retention).    losartan (COZAAR) 50 MG tablet TAKE 1 TABLET BY MOUTH DAILY.    montelukast (SINGULAIR) 10 MG tablet TAKE ONE TABLET AT BEDTIME GENERIC FOR SINGULAR    Ozempic, 1 MG/DOSE, 4 MG/3ML solution pen-injector INJECT 1MG UNDER THE SKIN INTO THE APPROPRIATE AREA AS DIRECTED ONCE PER WEEK    Symbicort 80-4.5 MCG/ACT inhaler     [] azithromycin (Zithromax Z-Wayne) 250 MG tablet Take 2 tablets the first day, then 1 tablet daily for 4 days.    fluconazole (DIFLUCAN) 150 MG tablet Take 1 tablet by mouth Every 3 (Three) Days. (Patient not taking: Reported on 2024)    levalbuterol (Xopenex HFA) 45 MCG/ACT inhaler Inhale 2 puffs Every 6 (Six) Hours As Needed for Wheezing. (Patient not taking: Reported on 2024)    methylPREDNISolone (MEDROL) 4 MG dose pack Take as directed on package instructions. (Patient not taking: Reported on 2024)    ondansetron (Zofran) 4 MG tablet Take 1 tablet by mouth Every 8 (Eight) Hours As Needed for Nausea or Vomiting. (Patient not taking: Reported on 2024)    [DISCONTINUED] ciprofloxacin  (Ciloxan) 0.3 % ophthalmic solution Administer 1 drop to both eyes 4 (Four) Times a Day. (Patient not taking: Reported on 2024)     No current facility-administered medications on file prior to visit.      Past Medical History:   Diagnosis Date    Abnormal Pap smear of cervix     Allergic     Anxiety     Asthma     GERD (gastroesophageal reflux disease)     Hyperlipidemia     Hypertension     Irritable bowel syndrome     Migraine 2019    Ovarian cyst     PONV (postoperative nausea and vomiting)      Past Surgical History:   Procedure Laterality Date    APPENDECTOMY  3/15/2021    CERVICAL BIOPSY  W/ LOOP ELECTRODE EXCISION      TUBAL ABDOMINAL LIGATION      WISDOM TOOTH EXTRACTION       Social History     Socioeconomic History    Marital status:    Tobacco Use    Smoking status: Former     Current packs/day: 0.00     Average packs/day: 0.3 packs/day for 5.0 years (1.3 ttl pk-yrs)     Types: Cigarettes     Start date: 2000     Quit date: 2005     Years since quittin.3     Passive exposure: Past    Smokeless tobacco: Never   Vaping Use    Vaping status: Never Used   Substance and Sexual Activity    Alcohol use: Yes     Comment: occ    Drug use: Not Currently    Sexual activity: Yes     Partners: Male     Birth control/protection: Tubal ligation     Family History   Problem Relation Age of Onset    Anxiety disorder Mother     Asthma Mother     Depression Mother     Hypertension Father     Breast cancer Maternal Grandmother     Colon cancer Maternal Grandmother        Prior Visit Notes/Records, Lab, Imaging, and Diagnostic Results Reviewed:  A1C:  Lab Results - Last 18 Months   Lab Units 23  1410   HEMOGLOBIN A1C % 5.4     GLUCOSE:  Lab Results - Last 18 Months   Lab Units 23  0842 23  1410 23  1402   GLUCOSE mg/dL 95 92 85     LIPID:  Lab Results - Last 18 Months   Lab Units 23  0842 23  1410   CHOLESTEROL mg/dL 195 196   LDL CHOL mg/dL 125* 111*  "  HDL CHOL mg/dL 53 41   TRIGLYCERIDES mg/dL 96 257*     PSA:No results for input(s): \"PSA\" in the last 45101 hours.  CBC:  Lab Results - Last 18 Months   Lab Units 11/27/23  0842 05/12/23  1410 01/19/23  1402   WBC 10*3/mm3 10.09 11.0* 7.98   HEMOGLOBIN g/dL 13.6 13.3 13.5   HEMATOCRIT % 42.6 40.5 40.9   PLATELETS 10*3/mm3 361 307 338      BMP/CMP:  Lab Results - Last 18 Months   Lab Units 11/27/23  0842 05/12/23  1410 01/19/23  1402   SODIUM mmol/L 140 140 139   POTASSIUM mmol/L 5.0 4.5 3.7   CHLORIDE mmol/L 102 102 100   CO2 mmol/L 30.6* 22 26.4   GLUCOSE mg/dL 95 92 85   BUN mg/dL 11 13 8   CREATININE mg/dL 0.64 0.76 0.72   EGFR RESULT mL/min/1.73 112.6 100 107.2   CALCIUM mg/dL 10.0 8.9 9.0     HEPATIC:  Lab Results - Last 18 Months   Lab Units 11/27/23  0842 05/12/23  1410 01/19/23  1402   ALT (SGPT) U/L 15 13 14   AST (SGOT) U/L 15 18 16   ALK PHOS U/L 70 76 77     Vit D:No results for input(s): \"ROHH70QB\" in the last 80572 hours.  THYROID:  Lab Results - Last 18 Months   Lab Units 11/27/23  0842 05/12/23  1410 01/19/23  1402   TSH uIU/mL 0.847 1.120 1.620   FREE T4 ng/dL  --  1.27 1.24     BMI Trend:  BMI Readings from Last 10 Encounters:   05/13/24 33.73 kg/m²   03/14/24 34.06 kg/m²   12/08/23 32.37 kg/m²   12/04/23 32.92 kg/m²   10/27/23 32.19 kg/m²   07/25/23 47.65 kg/m²   06/02/23 50.61 kg/m²   05/12/23 53.06 kg/m²   01/23/23 52.85 kg/m²   08/26/22 51.88 kg/m²     Objective   Vital Signs  /90 (BP Location: Left arm, Patient Position: Sitting, Cuff Size: Large Adult)   Pulse 79   Temp 97.1 °F (36.2 °C) (Infrared)   Resp 18   Ht 157.5 cm (62\")   Wt 83.6 kg (184 lb 6.4 oz)   LMP 04/20/2024 (Within Days)   SpO2 99%   BMI 33.73 kg/m²   Physical Exam  Vitals reviewed.   Constitutional:       General: She is not in acute distress.     Appearance: Normal appearance.   HENT:      Right Ear: Ear canal normal.      Left Ear: Tympanic membrane and ear canal normal.      Ears:      Comments: Right TM " erythematous-not bulging     Mouth/Throat:      Comments: Throat with thick postnasal drip right and posterior-tonsils are surgically absent aside from some rough textured tonsillar material-left side  Cardiovascular:      Rate and Rhythm: Normal rate and regular rhythm.   Pulmonary:      Effort: Pulmonary effort is normal.      Breath sounds: Normal breath sounds.         Assessment & Plan   Diagnoses and all orders for this visit:    1. Easy bruising (Primary)  -     CBC & Differential  -     Comprehensive Metabolic Panel  -     Protime-INR  -     APTT    2. Acute non-recurrent sinusitis, unspecified location    Other orders  -     clindamycin (CLEOCIN) 300 MG capsule; Take 1 capsule by mouth 3 (Three) Times a Day for 10 days.  Dispense: 30 capsule; Refill: 0    Discussions & Anticipatory Guidance:  Advised and educated plan of care.      The patient will Return for follow-up as needed.    Electronically signed by BRANDY Mcdonnell, 05/13/24, 9:33 AM CDT.

## 2024-05-14 LAB
ALBUMIN SERPL-MCNC: 4.3 G/DL (ref 3.5–5.2)
ALBUMIN/GLOB SERPL: 1.5 G/DL
ALP SERPL-CCNC: 78 U/L (ref 39–117)
ALT SERPL-CCNC: 13 U/L (ref 1–33)
APTT PPP: 25.3 SECONDS (ref 24.5–36)
AST SERPL-CCNC: 14 U/L (ref 1–32)
BASOPHILS # BLD AUTO: 0.03 10*3/MM3 (ref 0–0.2)
BASOPHILS NFR BLD AUTO: 0.3 % (ref 0–1.5)
BILIRUB SERPL-MCNC: 0.5 MG/DL (ref 0–1.2)
BUN SERPL-MCNC: 12 MG/DL (ref 6–20)
BUN/CREAT SERPL: 13.6 (ref 7–25)
CALCIUM SERPL-MCNC: 9.4 MG/DL (ref 8.6–10.5)
CHLORIDE SERPL-SCNC: 99 MMOL/L (ref 98–107)
CO2 SERPL-SCNC: 29.1 MMOL/L (ref 22–29)
CREAT SERPL-MCNC: 0.88 MG/DL (ref 0.57–1)
EGFRCR SERPLBLD CKD-EPI 2021: 83.2 ML/MIN/1.73
EOSINOPHIL # BLD AUTO: 0.11 10*3/MM3 (ref 0–0.4)
EOSINOPHIL NFR BLD AUTO: 1.1 % (ref 0.3–6.2)
ERYTHROCYTE [DISTWIDTH] IN BLOOD BY AUTOMATED COUNT: 13.1 % (ref 12.3–15.4)
GLOBULIN SER CALC-MCNC: 2.9 GM/DL
GLUCOSE SERPL-MCNC: 85 MG/DL (ref 65–99)
HCT VFR BLD AUTO: 44.6 % (ref 34–46.6)
HGB BLD-MCNC: 14.3 G/DL (ref 12–15.9)
IMM GRANULOCYTES # BLD AUTO: 0.05 10*3/MM3 (ref 0–0.05)
IMM GRANULOCYTES NFR BLD AUTO: 0.5 % (ref 0–0.5)
INR PPP: 0.9 (ref 0.91–1.09)
LYMPHOCYTES # BLD AUTO: 1.53 10*3/MM3 (ref 0.7–3.1)
LYMPHOCYTES NFR BLD AUTO: 15 % (ref 19.6–45.3)
MCH RBC QN AUTO: 29.4 PG (ref 26.6–33)
MCHC RBC AUTO-ENTMCNC: 32.1 G/DL (ref 31.5–35.7)
MCV RBC AUTO: 91.8 FL (ref 79–97)
MONOCYTES # BLD AUTO: 0.65 10*3/MM3 (ref 0.1–0.9)
MONOCYTES NFR BLD AUTO: 6.4 % (ref 5–12)
NEUTROPHILS # BLD AUTO: 7.8 10*3/MM3 (ref 1.7–7)
NEUTROPHILS NFR BLD AUTO: 76.7 % (ref 42.7–76)
NRBC BLD AUTO-RTO: 0 /100 WBC (ref 0–0.2)
PLATELET # BLD AUTO: 403 10*3/MM3 (ref 140–450)
POTASSIUM SERPL-SCNC: 5 MMOL/L (ref 3.5–5.2)
PROT SERPL-MCNC: 7.2 G/DL (ref 6–8.5)
PROTHROMBIN TIME: 12.5 SECONDS (ref 11.8–14.8)
RBC # BLD AUTO: 4.86 10*6/MM3 (ref 3.77–5.28)
SODIUM SERPL-SCNC: 137 MMOL/L (ref 136–145)
WBC # BLD AUTO: 10.17 10*3/MM3 (ref 3.4–10.8)

## 2024-05-14 NOTE — PROGRESS NOTES
Reviewed results - Quan[employer] message sent.  If not seen in 3 days (3 day alert set), will send to pool to call the message.      Electronically signed by BRANDY Mcdonnell, 05/14/24, 7:21 AM CDT.

## 2024-05-15 ENCOUNTER — PATIENT MESSAGE (OUTPATIENT)
Dept: FAMILY MEDICINE CLINIC | Facility: CLINIC | Age: 44
End: 2024-05-15
Payer: COMMERCIAL

## 2024-05-15 DIAGNOSIS — R23.3 EASY BRUISING: Primary | ICD-10-CM

## 2024-05-16 NOTE — TELEPHONE ENCOUNTER
From: Anna Means  To: Kvng Keenan  Sent: 5/15/2024 8:02 PM CDT  Subject: Test    I would like to do the other test. Yordan Yoon or something like that

## 2024-05-20 ENCOUNTER — TELEPHONE (OUTPATIENT)
Dept: FAMILY MEDICINE CLINIC | Facility: CLINIC | Age: 44
End: 2024-05-20
Payer: COMMERCIAL

## 2024-05-20 NOTE — TELEPHONE ENCOUNTER
Pt called and stated she would like to get her labs done at [facility] today for the redraw, please advise

## 2024-05-20 NOTE — TELEPHONE ENCOUNTER
Can we please call patient and schedule a lab visit for redraw on Von Willebrand Panel, Greta reports the sample slipped from basket and was not sent out with other supplies/samples and was left un refrigerated and is no longer viable.

## 2024-05-21 NOTE — TELEPHONE ENCOUNTER
Ok to draw at [facility] as long as they can do this panel.      Electronically signed by BRANDY Mcdonnell, 05/21/24, 7:27 AM CDT.

## 2024-05-22 LAB
FACT VIII ACT/NOR PPP: 192 % (ref 56–140)
PATH INTERP BLD-IMP: NORMAL
VWF AG ACT/NOR PPP IA: 237 % (ref 50–200)
VWF:RCO ACT/NOR PPP PL AGG: 178 % (ref 50–200)

## 2024-05-23 ENCOUNTER — PATIENT MESSAGE (OUTPATIENT)
Dept: FAMILY MEDICINE CLINIC | Facility: CLINIC | Age: 44
End: 2024-05-23
Payer: COMMERCIAL

## 2024-05-23 DIAGNOSIS — R79.1 ELEVATED FACTOR VIII LEVEL: Primary | ICD-10-CM

## 2024-05-23 NOTE — TELEPHONE ENCOUNTER
From: Anna Means  To: Kvng Keenan  Sent: 5/23/2024 8:27 AM CDT  Subject: Hemology     Yes, I would like to be referred to homology.

## 2024-05-23 NOTE — PROGRESS NOTES
Reviewed results - [employer] With Fetch[employer] message sent.  If not seen in 3 days (3 day alert set), will send to pool to call the message.      Electronically signed by BRANDY Mcdonnell, 05/23/24, 8:15 AM CDT.

## 2024-05-29 ENCOUNTER — TELEPHONE (OUTPATIENT)
Dept: ONCOLOGY | Facility: CLINIC | Age: 44
End: 2024-05-29
Payer: COMMERCIAL

## 2024-05-29 NOTE — TELEPHONE ENCOUNTER
Called and left a message for pt. We have cancelled her visit for Friday, 5/31. I left message for her to call back and let her know per Dr. Ortiz this patient is referred for elevated factor 8 level. Please relay to the patient and to the referring provider office that this is a nonspecific finding, and it is found incidentally in checking another panel. In an absent history of blood clots, this test abnormality becomes irrelevant. This does not need further evaluation or repetition. I think the appointment Friday can be canceled but we are happy to evaluate the patient if needed down the road. I have also routed back referral to referring office, Kvng Keenan.

## 2024-06-06 ENCOUNTER — OFFICE VISIT (OUTPATIENT)
Dept: OBSTETRICS AND GYNECOLOGY | Age: 44
End: 2024-06-06
Payer: COMMERCIAL

## 2024-06-06 VITALS
BODY MASS INDEX: 32.57 KG/M2 | SYSTOLIC BLOOD PRESSURE: 122 MMHG | WEIGHT: 177 LBS | HEIGHT: 62 IN | DIASTOLIC BLOOD PRESSURE: 90 MMHG

## 2024-06-06 DIAGNOSIS — B37.9 YEAST INFECTION: ICD-10-CM

## 2024-06-06 DIAGNOSIS — N83.201 RIGHT OVARIAN CYST: Primary | ICD-10-CM

## 2024-06-06 PROCEDURE — 99214 OFFICE O/P EST MOD 30 MIN: CPT | Performed by: NURSE PRACTITIONER

## 2024-06-06 RX ORDER — FLUCONAZOLE 150 MG/1
TABLET ORAL
Qty: 2 TABLET | Refills: 0 | Status: SHIPPED | OUTPATIENT
Start: 2024-06-06

## 2024-06-06 NOTE — PROGRESS NOTES
Chief Complaint   Patient presents with    Ovarian Cyst     Patient here for cyst check. Patient had u/s in office        History:  Anna Means is a 44 y.o. female who presents today for follow-up for evaluation of the above:  PT comes in today for ultrasound to follow up on cyst. Pt also just completed antibiotic and requests diflucan.         ROS:  Review of Systems   Constitutional:  Negative for activity change and unexpected weight loss.   Cardiovascular:  Negative for chest pain.   Gastrointestinal:  Negative for blood in stool, constipation and diarrhea.   Endocrine: Negative for cold intolerance and heat intolerance.   Genitourinary:  Positive for vaginal discharge. Negative for dyspareunia and pelvic pain.   Musculoskeletal:  Negative for arthralgias, back pain, neck pain and neck stiffness.   Skin:  Negative for rash.   Neurological:  Negative for dizziness and headache.   Psychiatric/Behavioral:  Negative for sleep disturbance. The patient is not nervous/anxious.        Ms. Means  reports that she quit smoking about 19 years ago. Her smoking use included cigarettes. She started smoking about 24 years ago. She has a 1.3 pack-year smoking history. She has been exposed to tobacco smoke. She has never used smokeless tobacco. She reports current alcohol use. She reports that she does not currently use drugs.      Current Outpatient Medications:     azelastine (ASTELIN) 0.1 % nasal spray, , Disp: , Rfl:     DULoxetine (CYMBALTA) 60 MG capsule, Take 1 capsule by mouth Daily., Disp: 30 capsule, Rfl: 5    EPINEPHrine (EPIPEN) 0.3 MG/0.3ML solution auto-injector injection, , Disp: , Rfl:     fluticasone (FLONASE) 50 MCG/ACT nasal spray, , Disp: , Rfl:     furosemide (LASIX) 20 MG tablet, Take 1 tablet by mouth Daily As Needed (Fluid retention)., Disp: 30 tablet, Rfl: 5    losartan (COZAAR) 50 MG tablet, TAKE 1 TABLET BY MOUTH DAILY., Disp: 30 tablet, Rfl: 5    montelukast (SINGULAIR) 10 MG tablet, TAKE ONE  "TABLET AT BEDTIME GENERIC FOR SINGULAR, Disp: 30 tablet, Rfl: 5    Ozempic, 1 MG/DOSE, 4 MG/3ML solution pen-injector, INJECT 1MG UNDER THE SKIN INTO THE APPROPRIATE AREA AS DIRECTED ONCE PER WEEK, Disp: 9 mL, Rfl: 0    Symbicort 80-4.5 MCG/ACT inhaler, , Disp: , Rfl:     fluconazole (Diflucan) 150 MG tablet, Take 1 now, repeat 1 week, Disp: 2 tablet, Rfl: 0      OBJECTIVE:  /90 (BP Location: Left arm, Patient Position: Sitting, Cuff Size: Adult)   Ht 157.5 cm (62\")   Wt 80.3 kg (177 lb)   LMP 05/14/2024 (Exact Date)   BMI 32.37 kg/m²    Physical Exam  Vitals and nursing note reviewed.   Constitutional:       Appearance: She is well-developed.   HENT:      Head: Normocephalic and atraumatic.   Eyes:      General:         Right eye: No discharge.         Left eye: No discharge.      Conjunctiva/sclera: Conjunctivae normal.   Neck:      Thyroid: No thyromegaly.   Cardiovascular:      Rate and Rhythm: Normal rate and regular rhythm.      Heart sounds: Normal heart sounds. No murmur heard.  Pulmonary:      Effort: Pulmonary effort is normal.      Breath sounds: Normal breath sounds.   Musculoskeletal:      Cervical back: Normal range of motion and neck supple.   Skin:     General: Skin is warm and dry.   Neurological:      Mental Status: She is alert and oriented to person, place, and time.   Psychiatric:         Mood and Affect: Mood normal.         Behavior: Behavior normal.         Thought Content: Thought content normal.         Judgment: Judgment normal.         Assessment/Plan    Diagnoses and all orders for this visit:    1. Right ovarian cyst (Primary)    2. Yeast infection  -     fluconazole (Diflucan) 150 MG tablet; Take 1 now, repeat 1 week  Dispense: 2 tablet; Refill: 0    Pt comes in today to further evaluate ovarian cyst.   Ultrasound done and shows cyst has resolved.     Pt also requests diflucan due to just having got off antibiotic.        An After Visit Summary was printed and given to the " patient at discharge.  Return for Annual physical AFTER 10/27/2024. Sooner if problems arise.          BRANDY Grissom  Electronically Signed

## 2024-06-20 DIAGNOSIS — E88.810 METABOLIC SYNDROME: ICD-10-CM

## 2024-06-20 RX ORDER — SEMAGLUTIDE 1.34 MG/ML
INJECTION, SOLUTION SUBCUTANEOUS
Qty: 9 ML | Refills: 0 | Status: SHIPPED | OUTPATIENT
Start: 2024-06-20

## 2024-06-20 NOTE — TELEPHONE ENCOUNTER
Rx Refill Note  Requested Prescriptions     Pending Prescriptions Disp Refills    Ozempic, 1 MG/DOSE, 4 MG/3ML solution pen-injector [Pharmacy Med Name: OZEMPIC 1 MG/DOSE (4 MG/3 ML)] 9 mL 0     Sig: INJECT 1MG UNDER THE SKIN INTO THE APPROPRIATE AREA AS DIRECTED ONCE PER WEEK      Last office visit with office: 05/13/2024  Next office visit with office: none    UDS:     DATE OF LAST REFILL: 03/20/2024    Controlled Substance Agreement:     ABIODUN OR SARMAD:          {TIP  Is Refill Pharmacy correct?:  Stacy Taveras MA  06/20/24, 14:56 CDT

## 2024-07-02 ENCOUNTER — TELEPHONE (OUTPATIENT)
Dept: HEMATOLOGY | Age: 44
End: 2024-07-02

## 2024-07-02 NOTE — TELEPHONE ENCOUNTER
Called and left detailed vm about appt. date and time on 07/05/24. I left message to come at appt. time & not any earlier & we would gladly take care of everything at that time of their follow up appt. time. Left number to call in case they can't make this appt. and needed to r/s.

## 2024-07-03 DIAGNOSIS — R79.1 ELEVATED FACTOR VIII LEVEL: Primary | ICD-10-CM

## 2024-07-03 NOTE — PROGRESS NOTES
OP HEMATOLOGY/ONCOLOGY CONSULTATION    Pt Name: Rafaela Faye  MRN: [id number]  YOB: 1980  Date of evaluation: 7/5/2024    Reason for Consultation:  Elevated Factor VIII level, Easy Bruising  Requesting Physcian: SRAVANTHI Montiel    History Obtained From:  patient and electronic medical record    CHIEF COMPLAINT:    Chief Complaint   Patient presents with    New Patient     Easy Bruising     HISTORY OF PRESENT ILLNESS:    Rafaela Faye is a 44 y.o.  female referred to the clinic by SRAVANTHI Montiel for evaluation of elevated Factor VIII level and easy bruising. Rafaela Faye is seen in hematology consultation 7/5/2024.   Rafaela recalls a history of chronic easy bruising, worse over the past 5 years, limited to the extremities.  She bumped her right forearm 2 months ago and states the bruise is just now healing.  She has a purple, nickel sized bruise to her anterior right thigh of unknown etiology.  She denies known injury.  She denies physical abuse.  Rafaela denies NSAID use.  She takes Cymbalta for generalized aches and gets an occasional steroid injection to her right shoulder.  She drinks vodka approximately 4 times per week.  She reports clotting with menses.  Patient denies epistaxis, gum bleeding, hematuria, melena or hematochezia.  She denies postprocedural bleeding with prior tubal ligation, cervical biopsy, appendectomy and wisdom teeth extraction.  Serology completed by PCP revealed elevated von Willebrand antigen and factor VIII activity.  Platelets normal at 403,000. Liver and Renal function normal.  PT/INR and PTT normal.  Hematology consultation is requested for further evaluation.    Labs 5/13/2024:  CBC: WBC: 10.17, Hgb: 14.3, Hct: 44.6, MCV: 91.8, Platelets: 403,000, Neutrophils: 7.8, Lymphocytes: 1.53, Monocytes: 0.65, Eosinophils: 0.11, Basophils: 0.03  CMP: Creatinine: 0.88, GFR: 83.2, Calcium: 9.4, Total Protein: 7.2  PT: 12.5/INR: 0.9  PTT: 25.3    Labs 5/21/2024:  Factor

## 2024-07-05 ENCOUNTER — OFFICE VISIT (OUTPATIENT)
Dept: HEMATOLOGY | Age: 44
End: 2024-07-05
Payer: COMMERCIAL

## 2024-07-05 ENCOUNTER — HOSPITAL ENCOUNTER (OUTPATIENT)
Dept: INFUSION THERAPY | Age: 44
Discharge: HOME | End: 2024-07-05
Payer: COMMERCIAL

## 2024-07-05 VITALS
HEIGHT: 62 IN | TEMPERATURE: 98.1 F | HEART RATE: 71 BPM | SYSTOLIC BLOOD PRESSURE: 130 MMHG | DIASTOLIC BLOOD PRESSURE: 86 MMHG | OXYGEN SATURATION: 99 % | BODY MASS INDEX: 34.04 KG/M2 | WEIGHT: 185 LBS

## 2024-07-05 DIAGNOSIS — R23.3 EASY BRUISING: Primary | ICD-10-CM

## 2024-07-05 DIAGNOSIS — R23.3 EASY BRUISING: ICD-10-CM

## 2024-07-05 DIAGNOSIS — R79.1 ELEVATED FACTOR VIII LEVEL: ICD-10-CM

## 2024-07-05 DIAGNOSIS — Z71.89 CARE PLAN DISCUSSED WITH PATIENT: ICD-10-CM

## 2024-07-05 LAB
BASOPHILS # BLD: 0.03 K/UL (ref 0.01–0.08)
BASOPHILS NFR BLD: 0.3 % (ref 0.1–1.2)
CLOSURE TME BLD-IMP: NORMAL
CLOSURE TME COLL+EPINEP BLD: 91 SEC (ref 84–176)
EOSINOPHIL # BLD: 0.11 K/UL (ref 0.04–0.54)
EOSINOPHIL NFR BLD: 1.2 % (ref 0.7–7)
ERYTHROCYTE [DISTWIDTH] IN BLOOD BY AUTOMATED COUNT: 12.8 % (ref 11.7–14.4)
HCT VFR BLD AUTO: 42.9 % (ref 34.1–44.9)
HGB BLD-MCNC: 14.5 G/DL (ref 11.2–15.7)
LYMPHOCYTES # BLD: 1.81 K/UL (ref 1.18–3.74)
LYMPHOCYTES NFR BLD: 19.4 % (ref 19.3–53.1)
MCH RBC QN AUTO: 30.1 PG (ref 25.6–32.2)
MCHC RBC AUTO-ENTMCNC: 33.8 G/DL (ref 32.3–35.5)
MCV RBC AUTO: 89 FL (ref 79.4–94.8)
MONOCYTES # BLD: 0.46 K/UL (ref 0.24–0.82)
MONOCYTES NFR BLD: 4.9 % (ref 4.7–12.5)
NEUTROPHILS # BLD: 6.89 K/UL (ref 1.56–6.13)
NEUTS SEG NFR BLD: 74 % (ref 34–71.1)
PLATELET # BLD AUTO: 310 K/UL (ref 182–369)
PMV BLD AUTO: 10 FL (ref 7.4–10.4)
RBC # BLD AUTO: 4.82 M/UL (ref 3.93–5.22)
WBC # BLD AUTO: 9.32 K/UL (ref 3.98–10.04)

## 2024-07-05 PROCEDURE — 99204 OFFICE O/P NEW MOD 45 MIN: CPT | Performed by: NURSE PRACTITIONER

## 2024-07-05 PROCEDURE — 36415 COLL VENOUS BLD VENIPUNCTURE: CPT

## 2024-07-05 PROCEDURE — 85025 COMPLETE CBC W/AUTO DIFF WBC: CPT

## 2024-07-05 PROCEDURE — 99202 OFFICE O/P NEW SF 15 MIN: CPT

## 2024-07-05 RX ORDER — SEMAGLUTIDE 1.34 MG/ML
1 INJECTION, SOLUTION SUBCUTANEOUS
COMMUNITY

## 2024-07-05 RX ORDER — FLUTICASONE PROPIONATE 50 MCG
1 SPRAY, SUSPENSION (ML) NASAL DAILY
COMMUNITY

## 2024-07-05 RX ORDER — FUROSEMIDE 20 MG/1
20 TABLET ORAL PRN
COMMUNITY

## 2024-07-05 RX ORDER — BUDESONIDE AND FORMOTEROL FUMARATE DIHYDRATE 80; 4.5 UG/1; UG/1
2 AEROSOL RESPIRATORY (INHALATION) 2 TIMES DAILY
COMMUNITY

## 2024-07-05 RX ORDER — AZELASTINE 1 MG/ML
1 SPRAY, METERED NASAL 2 TIMES DAILY
COMMUNITY

## 2024-07-05 RX ORDER — LOSARTAN POTASSIUM 50 MG/1
50 TABLET ORAL DAILY
COMMUNITY

## 2024-07-05 RX ORDER — MONTELUKAST SODIUM 10 MG/1
10 TABLET ORAL NIGHTLY
COMMUNITY

## 2024-07-05 RX ORDER — DULOXETIN HYDROCHLORIDE 60 MG/1
60 CAPSULE, DELAYED RELEASE ORAL DAILY
COMMUNITY

## 2024-07-05 ASSESSMENT — ENCOUNTER SYMPTOMS
SHORTNESS OF BREATH: 1
BACK PAIN: 1
VOMITING: 0
EYE DISCHARGE: 0
TROUBLE SWALLOWING: 0
WHEEZING: 0
NAUSEA: 0
EYE ITCHING: 0
DIARRHEA: 0
COUGH: 0
SORE THROAT: 0
CONSTIPATION: 0
ABDOMINAL PAIN: 1

## 2024-07-09 LAB
PHYTONADIONE SERPL-MCNC: 1.56 NMOL/L (ref 0.22–4.88)
VIT C SERPL-MCNC: 24 UMOL/L (ref 23–114)

## 2024-09-26 ENCOUNTER — OFFICE VISIT (OUTPATIENT)
Dept: FAMILY MEDICINE CLINIC | Facility: CLINIC | Age: 44
End: 2024-09-26
Payer: COMMERCIAL

## 2024-09-26 VITALS
WEIGHT: 186 LBS | TEMPERATURE: 98.3 F | BODY MASS INDEX: 34.23 KG/M2 | RESPIRATION RATE: 18 BRPM | DIASTOLIC BLOOD PRESSURE: 70 MMHG | HEART RATE: 75 BPM | SYSTOLIC BLOOD PRESSURE: 128 MMHG | OXYGEN SATURATION: 98 % | HEIGHT: 62 IN

## 2024-09-26 DIAGNOSIS — E66.09 CLASS 1 OBESITY DUE TO EXCESS CALORIES WITH SERIOUS COMORBIDITY AND BODY MASS INDEX (BMI) OF 34.0 TO 34.9 IN ADULT: ICD-10-CM

## 2024-09-26 DIAGNOSIS — E88.810 METABOLIC SYNDROME: ICD-10-CM

## 2024-09-26 DIAGNOSIS — F33.1 MODERATE EPISODE OF RECURRENT MAJOR DEPRESSIVE DISORDER: Primary | ICD-10-CM

## 2024-09-26 DIAGNOSIS — F41.9 ANXIETY: ICD-10-CM

## 2024-09-26 DIAGNOSIS — R91.1 LUNG NODULE: ICD-10-CM

## 2024-09-26 DIAGNOSIS — R60.0 PERIPHERAL EDEMA: ICD-10-CM

## 2024-09-26 DIAGNOSIS — K21.9 GASTROESOPHAGEAL REFLUX DISEASE WITHOUT ESOPHAGITIS: ICD-10-CM

## 2024-09-26 PROCEDURE — 99214 OFFICE O/P EST MOD 30 MIN: CPT | Performed by: NURSE PRACTITIONER

## 2024-09-26 RX ORDER — FUROSEMIDE 20 MG
20 TABLET ORAL DAILY PRN
Qty: 30 TABLET | Refills: 5 | Status: SHIPPED | OUTPATIENT
Start: 2024-09-26

## 2024-09-26 RX ORDER — SEMAGLUTIDE 1.34 MG/ML
1 INJECTION, SOLUTION SUBCUTANEOUS WEEKLY
Qty: 9 ML | Refills: 3 | Status: SHIPPED | OUTPATIENT
Start: 2024-09-26

## 2024-09-26 RX ORDER — MONTELUKAST SODIUM 10 MG/1
10 TABLET ORAL NIGHTLY
Qty: 90 TABLET | Refills: 3 | Status: SHIPPED | OUTPATIENT
Start: 2024-09-26

## 2024-09-26 RX ORDER — DULOXETIN HYDROCHLORIDE 60 MG/1
60 CAPSULE, DELAYED RELEASE ORAL DAILY
Qty: 90 CAPSULE | Refills: 3 | Status: SHIPPED | OUTPATIENT
Start: 2024-09-26

## 2024-09-27 LAB
ALBUMIN SERPL-MCNC: 4.3 G/DL (ref 3.5–5.2)
ALBUMIN/GLOB SERPL: 1.6 G/DL
ALP SERPL-CCNC: 75 U/L (ref 39–117)
ALT SERPL-CCNC: 12 U/L (ref 1–33)
AST SERPL-CCNC: 14 U/L (ref 1–32)
BASOPHILS # BLD AUTO: 0.04 10*3/MM3 (ref 0–0.2)
BASOPHILS NFR BLD AUTO: 0.6 % (ref 0–1.5)
BILIRUB SERPL-MCNC: 0.5 MG/DL (ref 0–1.2)
BUN SERPL-MCNC: 12 MG/DL (ref 6–20)
BUN/CREAT SERPL: 13.2 (ref 7–25)
CALCIUM SERPL-MCNC: 9.1 MG/DL (ref 8.6–10.5)
CHLORIDE SERPL-SCNC: 103 MMOL/L (ref 98–107)
CHOLEST SERPL-MCNC: 202 MG/DL (ref 0–200)
CO2 SERPL-SCNC: 27.4 MMOL/L (ref 22–29)
CREAT SERPL-MCNC: 0.91 MG/DL (ref 0.57–1)
EGFRCR SERPLBLD CKD-EPI 2021: 79.9 ML/MIN/1.73
EOSINOPHIL # BLD AUTO: 0.1 10*3/MM3 (ref 0–0.4)
EOSINOPHIL NFR BLD AUTO: 1.5 % (ref 0.3–6.2)
ERYTHROCYTE [DISTWIDTH] IN BLOOD BY AUTOMATED COUNT: 12.4 % (ref 12.3–15.4)
GLOBULIN SER CALC-MCNC: 2.7 GM/DL
GLUCOSE SERPL-MCNC: 101 MG/DL (ref 65–99)
HBA1C MFR BLD: 5.3 % (ref 4.8–5.6)
HCT VFR BLD AUTO: 44.8 % (ref 34–46.6)
HDLC SERPL-MCNC: 51 MG/DL (ref 40–60)
HGB BLD-MCNC: 14.7 G/DL (ref 12–15.9)
IMM GRANULOCYTES # BLD AUTO: 0.02 10*3/MM3 (ref 0–0.05)
IMM GRANULOCYTES NFR BLD AUTO: 0.3 % (ref 0–0.5)
LDLC SERPL CALC-MCNC: 136 MG/DL (ref 0–100)
LYMPHOCYTES # BLD AUTO: 1.61 10*3/MM3 (ref 0.7–3.1)
LYMPHOCYTES NFR BLD AUTO: 24.8 % (ref 19.6–45.3)
MCH RBC QN AUTO: 29.2 PG (ref 26.6–33)
MCHC RBC AUTO-ENTMCNC: 32.8 G/DL (ref 31.5–35.7)
MCV RBC AUTO: 88.9 FL (ref 79–97)
MONOCYTES # BLD AUTO: 0.48 10*3/MM3 (ref 0.1–0.9)
MONOCYTES NFR BLD AUTO: 7.4 % (ref 5–12)
NEUTROPHILS # BLD AUTO: 4.24 10*3/MM3 (ref 1.7–7)
NEUTROPHILS NFR BLD AUTO: 65.4 % (ref 42.7–76)
NRBC BLD AUTO-RTO: 0 /100 WBC (ref 0–0.2)
PLATELET # BLD AUTO: 332 10*3/MM3 (ref 140–450)
POTASSIUM SERPL-SCNC: 4.8 MMOL/L (ref 3.5–5.2)
PROT SERPL-MCNC: 7 G/DL (ref 6–8.5)
RBC # BLD AUTO: 5.04 10*6/MM3 (ref 3.77–5.28)
SODIUM SERPL-SCNC: 138 MMOL/L (ref 136–145)
TRIGL SERPL-MCNC: 85 MG/DL (ref 0–150)
TSH SERPL DL<=0.005 MIU/L-ACNC: 1.21 UIU/ML (ref 0.27–4.2)
VLDLC SERPL CALC-MCNC: 15 MG/DL (ref 5–40)
WBC # BLD AUTO: 6.49 10*3/MM3 (ref 3.4–10.8)

## 2024-10-06 NOTE — PROGRESS NOTES
OP HEMATOLOGY/ONCOLOGY PROGRESS NOTE      Pt Name: Rafaela Faye  YOB: 1980  MRN: [id number]  PCP: SRAVANTHI Montiel  Date of evaluation: 10/7/24    History Obtained From:  Patient and electronic medical record    CHIEF COMPLAINT:    Chief Complaint   Patient presents with    Follow-up     Easy bruising       HISTORY OF PRESENT ILLNESS:   Rafaela Faye is a 44 y.o.  female returning to the clinic for follow-up and discussion of serology completed for complaint of easy bruising.  Bruising some less, however she reports persistent bruising.  She currently has a small 1 cm light purple bruise to her left lower extremity.  She also brings to my attention both varicose veins and spider varicose veins to her lower extremities bilaterally. She is drinking a vodka mixed drink approximately once per week.    HEMATOLOGY HISTORY:   Rafaela Faye was seen in initial hematology consultation 7/5/2024, referred by SRAVANTHI Montiel, for evaluation of elevated factor VIII level and easy bruising.   Rafaela recalls a history of chronic easy bruising, worse over the past 5 years, limited to the extremities.  She bumped her right forearm 2 months ago and states the bruise is just now healing.  She has a purple, nickel sized bruise to her anterior right thigh of unknown etiology.  She denies known injury.  She denies physical abuse.  Rafaela denies NSAID use.  She takes Cymbalta for generalized aches and gets an occasional steroid injection to her right shoulder.  She drinks vodka approximately 4 times per week.  She reports clotting with menses.  Patient denies epistaxis, gum bleeding, hematuria, melena or hematochezia.  She denies postprocedural bleeding with prior tubal ligation, cervical biopsy, appendectomy and wisdom teeth extraction.  Serology completed by PCP revealed elevated von Willebrand antigen and factor VIII activity.  Platelets normal at 403,000. Liver and Renal function normal.  PT/INR and PTT

## 2024-10-07 ENCOUNTER — OFFICE VISIT (OUTPATIENT)
Dept: HEMATOLOGY | Age: 44
End: 2024-10-07
Payer: COMMERCIAL

## 2024-10-07 ENCOUNTER — HOSPITAL ENCOUNTER (OUTPATIENT)
Dept: INFUSION THERAPY | Age: 44
Discharge: HOME | End: 2024-10-07
Payer: COMMERCIAL

## 2024-10-07 VITALS
TEMPERATURE: 97.6 F | BODY MASS INDEX: 34.32 KG/M2 | WEIGHT: 186.5 LBS | HEIGHT: 62 IN | DIASTOLIC BLOOD PRESSURE: 72 MMHG | HEART RATE: 82 BPM | OXYGEN SATURATION: 100 % | SYSTOLIC BLOOD PRESSURE: 122 MMHG

## 2024-10-07 DIAGNOSIS — R23.3 EASY BRUISING: ICD-10-CM

## 2024-10-07 DIAGNOSIS — Z71.89 CARE PLAN DISCUSSED WITH PATIENT: ICD-10-CM

## 2024-10-07 DIAGNOSIS — R23.3 EASY BRUISING: Primary | ICD-10-CM

## 2024-10-07 LAB
BASOPHILS # BLD: 0.03 K/UL (ref 0.01–0.08)
BASOPHILS NFR BLD: 0.3 % (ref 0.1–1.2)
EOSINOPHIL # BLD: 0.09 K/UL (ref 0.04–0.54)
EOSINOPHIL NFR BLD: 1 % (ref 0.7–7)
ERYTHROCYTE [DISTWIDTH] IN BLOOD BY AUTOMATED COUNT: 12.8 % (ref 11.7–14.4)
FOLATE SERPL-MCNC: 10.6 NG/ML (ref 4.8–37.3)
HCT VFR BLD AUTO: 44.6 % (ref 34.1–44.9)
HGB BLD-MCNC: 14.4 G/DL (ref 11.2–15.7)
LYMPHOCYTES # BLD: 1.32 K/UL (ref 1.18–3.74)
LYMPHOCYTES NFR BLD: 15.2 % (ref 19.3–53.1)
MCH RBC QN AUTO: 28.8 PG (ref 25.6–32.2)
MCHC RBC AUTO-ENTMCNC: 32.3 G/DL (ref 32.3–35.5)
MCV RBC AUTO: 89.2 FL (ref 79.4–94.8)
MONOCYTES # BLD: 0.36 K/UL (ref 0.24–0.82)
MONOCYTES NFR BLD: 4.1 % (ref 4.7–12.5)
NEUTROPHILS # BLD: 6.87 K/UL (ref 1.56–6.13)
NEUTS SEG NFR BLD: 79.1 % (ref 34–71.1)
PLATELET # BLD AUTO: 320 K/UL (ref 182–369)
PMV BLD AUTO: 9.9 FL (ref 7.4–10.4)
RBC # BLD AUTO: 5 M/UL (ref 3.93–5.22)
VIT B12 SERPL-MCNC: 177 PG/ML (ref 232–1245)
WBC # BLD AUTO: 8.7 K/UL (ref 3.98–10.04)

## 2024-10-07 PROCEDURE — 99212 OFFICE O/P EST SF 10 MIN: CPT

## 2024-10-07 PROCEDURE — 85025 COMPLETE CBC W/AUTO DIFF WBC: CPT

## 2024-10-07 PROCEDURE — 99213 OFFICE O/P EST LOW 20 MIN: CPT | Performed by: NURSE PRACTITIONER

## 2024-10-07 PROCEDURE — 36415 COLL VENOUS BLD VENIPUNCTURE: CPT

## 2024-10-07 RX ORDER — EPINEPHRINE 0.3 MG/.3ML
0.3 INJECTION SUBCUTANEOUS PRN
COMMUNITY
Start: 2024-01-18

## 2024-10-07 RX ORDER — ASCORBIC ACID 500 MG
500 TABLET ORAL DAILY
COMMUNITY

## 2024-10-07 ASSESSMENT — ENCOUNTER SYMPTOMS
VOMITING: 0
WHEEZING: 0
DIARRHEA: 0
NAUSEA: 0
EYE ITCHING: 0
ABDOMINAL PAIN: 0
SORE THROAT: 0
SHORTNESS OF BREATH: 0
TROUBLE SWALLOWING: 0
COUGH: 0
BACK PAIN: 1
CONSTIPATION: 0
EYE DISCHARGE: 0

## 2024-10-09 DIAGNOSIS — E53.8 VITAMIN B12 DEFICIENCY: Primary | ICD-10-CM

## 2024-10-09 LAB
A-TOCOPHEROL VIT E SERPL-MCNC: 14.4 MG/L (ref 5.5–18)
A2 MACROGLOB SERPL-MCNC: 182 MG/DL (ref 110–276)
ALT SERPL W P-5'-P-CCNC: 15 IU/L (ref 0–40)
APO A-I SERPL-MCNC: 175 MG/DL (ref 116–209)
BETA+GAMMA TOCOPHEROL SERPL-MCNC: 2.4 MG/L (ref 0–6)
BILIRUB SERPL-MCNC: 0.2 MG/DL (ref 0–1.2)
COMMENT: NORMAL
FIBROSIS SCORE: NORMAL
FIBROSIS STAGE: NORMAL
GGT SERPL-CCNC: 8 IU/L (ref 0–60)
HAPTOGLOB SERPL-MCNC: 183 MG/DL (ref 42–296)
INTERPRETATIONS:: NORMAL
LIMITATIONS:: NORMAL
NECROINFLAMM ACTIVITY SCORING:: NORMAL
NECROINFLAMMAT ACTIVITY GRADE: NORMAL
NECROINFLAMMAT ACTIVITY SCORE: 0.03 (ref 0–0.17)
VIT C SERPL-MCNC: 93 UMOL/L (ref 23–114)

## 2024-10-09 RX ORDER — CYANOCOBALAMIN 1000 UG/ML
1000 INJECTION, SOLUTION INTRAMUSCULAR; SUBCUTANEOUS ONCE
OUTPATIENT
Start: 2024-10-09

## 2024-10-09 RX ORDER — CYANOCOBALAMIN 1000 UG/ML
1000 INJECTION, SOLUTION INTRAMUSCULAR; SUBCUTANEOUS ONCE
OUTPATIENT
Start: 2024-10-09 | End: 2024-10-09

## 2024-10-10 ENCOUNTER — CLINICAL DOCUMENTATION (OUTPATIENT)
Facility: HOSPITAL | Age: 44
End: 2024-10-10

## 2024-10-11 ENCOUNTER — HOSPITAL ENCOUNTER (OUTPATIENT)
Dept: CT IMAGING | Facility: HOSPITAL | Age: 44
Discharge: HOME | End: 2024-10-11
Admitting: NURSE PRACTITIONER
Payer: COMMERCIAL

## 2024-10-11 DIAGNOSIS — R91.1 LUNG NODULE: ICD-10-CM

## 2024-10-11 PROCEDURE — 71250 CT THORAX DX C-: CPT

## 2024-10-14 NOTE — PROGRESS NOTES
Reviewed results - [employer] message sent.  If not seen in 3 days (3 day alert set), will send to pool to call the message.      Electronically signed by BRANDY Mcdonnell, 10/14/24, 8:17 AM CDT.

## 2024-10-18 ENCOUNTER — HOSPITAL ENCOUNTER (OUTPATIENT)
Dept: INFUSION THERAPY | Age: 44
Discharge: HOME | End: 2024-10-18
Payer: COMMERCIAL

## 2024-10-18 DIAGNOSIS — E53.8 VITAMIN B12 DEFICIENCY: Primary | ICD-10-CM

## 2024-10-18 PROCEDURE — 96372 THER/PROPH/DIAG INJ SC/IM: CPT

## 2024-10-18 PROCEDURE — 6360000002 HC RX W HCPCS: Performed by: NURSE PRACTITIONER

## 2024-10-18 RX ORDER — CYANOCOBALAMIN 1000 UG/ML
1000 INJECTION, SOLUTION INTRAMUSCULAR; SUBCUTANEOUS ONCE
Status: COMPLETED | OUTPATIENT
Start: 2024-10-18 | End: 2024-10-18

## 2024-10-18 RX ORDER — CYANOCOBALAMIN 1000 UG/ML
1000 INJECTION, SOLUTION INTRAMUSCULAR; SUBCUTANEOUS ONCE
OUTPATIENT
Start: 2024-10-25

## 2024-10-18 RX ORDER — CYANOCOBALAMIN 1000 UG/ML
1000 INJECTION, SOLUTION INTRAMUSCULAR; SUBCUTANEOUS ONCE
OUTPATIENT
Start: 2024-10-25 | End: 2024-10-25

## 2024-10-18 RX ADMIN — CYANOCOBALAMIN 1000 MCG: 1000 INJECTION, SOLUTION INTRAMUSCULAR; SUBCUTANEOUS at 14:45

## 2024-10-21 ENCOUNTER — TELEPHONE (OUTPATIENT)
Dept: FAMILY MEDICINE CLINIC | Facility: CLINIC | Age: 44
End: 2024-10-21
Payer: COMMERCIAL

## 2024-10-21 NOTE — TELEPHONE ENCOUNTER
Caller: Anna Means    Relationship: Self    Best call back number: [phone]    What medication are you requesting: PAXLOVID     What are your current symptoms: TESTED POSITIVE FOR COVID YESTERDAY   FEVER CHEST CONGESTION HEAD ACHE BODY ACHES AND SLIGHT SORE THROAT SINUS PRESSURE     How long have you been experiencing symptoms: YESTERDAY         If a prescription is needed, what is your preferred pharmacy and phone number: [facility] - [place], [place] - [address] [facility] [phone] [facility] [phone] FX

## 2024-10-28 ENCOUNTER — OFFICE VISIT (OUTPATIENT)
Dept: OBSTETRICS AND GYNECOLOGY | Age: 44
End: 2024-10-28
Payer: COMMERCIAL

## 2024-10-28 VITALS
BODY MASS INDEX: 32.94 KG/M2 | WEIGHT: 179 LBS | DIASTOLIC BLOOD PRESSURE: 72 MMHG | SYSTOLIC BLOOD PRESSURE: 126 MMHG | HEIGHT: 62 IN

## 2024-10-28 DIAGNOSIS — Z01.419 WELL WOMAN EXAM WITH ROUTINE GYNECOLOGICAL EXAM: Primary | ICD-10-CM

## 2024-10-28 DIAGNOSIS — Z12.31 ENCOUNTER FOR SCREENING MAMMOGRAM FOR BREAST CANCER: ICD-10-CM

## 2024-10-28 DIAGNOSIS — Z11.3 SCREEN FOR STD (SEXUALLY TRANSMITTED DISEASE): ICD-10-CM

## 2024-10-28 PROCEDURE — 87491 CHLMYD TRACH DNA AMP PROBE: CPT | Performed by: NURSE PRACTITIONER

## 2024-10-28 PROCEDURE — 99459 PELVIC EXAMINATION: CPT | Performed by: NURSE PRACTITIONER

## 2024-10-28 PROCEDURE — 87624 HPV HI-RISK TYP POOLED RSLT: CPT | Performed by: NURSE PRACTITIONER

## 2024-10-28 PROCEDURE — G0123 SCREEN CERV/VAG THIN LAYER: HCPCS | Performed by: NURSE PRACTITIONER

## 2024-10-28 PROCEDURE — 99396 PREV VISIT EST AGE 40-64: CPT | Performed by: NURSE PRACTITIONER

## 2024-10-28 PROCEDURE — 87591 N.GONORRHOEAE DNA AMP PROB: CPT | Performed by: NURSE PRACTITIONER

## 2024-10-28 PROCEDURE — 87661 TRICHOMONAS VAGINALIS AMPLIF: CPT | Performed by: NURSE PRACTITIONER

## 2024-10-28 RX ORDER — ASCORBIC ACID 500 MG
1 TABLET ORAL DAILY
COMMUNITY

## 2024-10-28 NOTE — PROGRESS NOTES
"Chief Complaint   Patient presents with    Gynecologic Exam     Patient here for annual, last pap 10/27/23, mammogram 11/8/23, patient is wanting to be screening for STDs for a peace of mind. Denies any symptoms.         Subjective     Anna Means is a 44 y.o. female    History of Present Illness  Pt comes in today for annual wellness exam. Denies any complaints.     /72 (BP Location: Left arm, Patient Position: Sitting, Cuff Size: Adult)   Ht 157.5 cm (62\")   Wt 81.2 kg (179 lb)   LMP 10/19/2024   BMI 32.74 kg/m²     Outpatient Encounter Medications as of 10/28/2024   Medication Sig Dispense Refill    azelastine (ASTELIN) 0.1 % nasal spray       Cariprazine HCl (Vraylar) 1.5 MG capsule capsule Take 1 capsule by mouth Daily. 30 capsule 5    DULoxetine (CYMBALTA) 60 MG capsule Take 1 capsule by mouth Daily. 90 capsule 3    EPINEPHrine (EPIPEN) 0.3 MG/0.3ML solution auto-injector injection       fluticasone (FLONASE) 50 MCG/ACT nasal spray       furosemide (LASIX) 20 MG tablet TAKE 1 TABLET BY MOUTH DAILY AS NEEDED (FLUID RETENTION). 30 tablet 5    losartan (COZAAR) 50 MG tablet TAKE 1 TABLET BY MOUTH DAILY. 30 tablet 5    montelukast (SINGULAIR) 10 MG tablet Take 1 tablet by mouth Every Night. 90 tablet 3    Semaglutide, 1 MG/DOSE, (Ozempic, 1 MG/DOSE,) 4 MG/3ML solution pen-injector Inject 1 mg under the skin into the appropriate area as directed 1 (One) Time Per Week. 9 mL 3    Symbicort 80-4.5 MCG/ACT inhaler       vitamin C (ASCORBIC ACID) 500 MG tablet Take 1 tablet by mouth Daily.      [DISCONTINUED] Nirmatrelvir & Ritonavir, 300mg/100mg, (PAXLOVID) Take 3 tablets by mouth 2 (Two) Times a Day. 30 each 0     No facility-administered encounter medications on file as of 10/28/2024.       Past Medical History  Past Medical History:   Diagnosis Date    Abnormal Pap smear of cervix     Allergic     Anxiety     Asthma     GERD (gastroesophageal reflux disease)     Hyperlipidemia     Hypertension 2023    " Irritable bowel syndrome     Migraine 2019    Ovarian cyst 2021    PONV (postoperative nausea and vomiting)         Surgical History  Past Surgical History:   Procedure Laterality Date    APPENDECTOMY  3/15/2021    CERVICAL BIOPSY  W/ LOOP ELECTRODE EXCISION  2004    TUBAL ABDOMINAL LIGATION      WISDOM TOOTH EXTRACTION         Family History  Family History   Problem Relation Age of Onset    Hypertension Father     Anxiety disorder Mother     Asthma Mother     Depression Mother     Lung cancer Mother     Breast cancer Maternal Grandmother     Colon cancer Maternal Grandmother        The following portions of the patient's history were reviewed and updated as appropriate: allergies, current medications, past family history, past medical history, past social history, past surgical history, and problem list.    Review of Systems   Constitutional:  Negative for activity change and unexpected weight loss.   Cardiovascular:  Negative for chest pain.   Gastrointestinal:  Negative for blood in stool, constipation and diarrhea.   Endocrine: Negative for cold intolerance and heat intolerance.   Genitourinary:  Negative for dyspareunia, pelvic pain and vaginal discharge.   Musculoskeletal:  Negative for arthralgias, back pain, neck pain and neck stiffness.   Skin:  Negative for rash.   Neurological:  Negative for dizziness and headache.   Psychiatric/Behavioral:  Negative for sleep disturbance. The patient is not nervous/anxious.        Objective   Physical Exam  Vitals and nursing note reviewed. Exam conducted with a chaperone present.   Constitutional:       General: She is not in acute distress.     Appearance: She is well-developed. She is not diaphoretic.   HENT:      Head: Normocephalic.      Right Ear: External ear normal.      Left Ear: External ear normal.      Nose: Nose normal.   Eyes:      General: No scleral icterus.        Right eye: No discharge.         Left eye: No discharge.      Conjunctiva/sclera:  Conjunctivae normal.      Pupils: Pupils are equal, round, and reactive to light.   Neck:      Thyroid: No thyromegaly.      Vascular: No carotid bruit.      Trachea: No tracheal deviation.   Cardiovascular:      Rate and Rhythm: Normal rate and regular rhythm.      Heart sounds: Normal heart sounds. No murmur heard.  Pulmonary:      Effort: Pulmonary effort is normal. No respiratory distress.      Breath sounds: Normal breath sounds. No wheezing.   Chest:   Breasts:     Breasts are symmetrical.      Right: Normal. No swelling, bleeding, inverted nipple, mass, nipple discharge, skin change or tenderness.      Left: Normal. No swelling, bleeding, inverted nipple, mass, nipple discharge, skin change or tenderness.   Abdominal:      General: There is no distension.      Palpations: Abdomen is soft. There is no mass.      Tenderness: There is no abdominal tenderness. There is no right CVA tenderness, left CVA tenderness or guarding.      Hernia: No hernia is present. There is no hernia in the left inguinal area or right inguinal area.   Genitourinary:     General: Normal vulva.      Exam position: Lithotomy position.      Labia:         Right: No rash, tenderness, lesion or injury.         Left: No rash, tenderness, lesion or injury.       Vagina: Normal. No signs of injury and foreign body. No vaginal discharge, erythema, tenderness or bleeding.      Cervix: Normal.      Uterus: Normal. Not enlarged, not fixed and not tender.       Adnexa: Right adnexa normal and left adnexa normal.        Right: No mass, tenderness or fullness.          Left: No mass, tenderness or fullness.        Rectum: Normal. No mass.      Comments:   BSU normal  Urethral meatus  Normal  Perineum  Normal  Musculoskeletal:         General: No tenderness. Normal range of motion.      Cervical back: Normal range of motion and neck supple.   Lymphadenopathy:      Head:      Right side of head: No submental, submandibular, tonsillar, preauricular,  posterior auricular or occipital adenopathy.      Left side of head: No submental, submandibular, tonsillar, preauricular, posterior auricular or occipital adenopathy.      Cervical: No cervical adenopathy.      Right cervical: No superficial, deep or posterior cervical adenopathy.     Left cervical: No superficial, deep or posterior cervical adenopathy.      Upper Body:      Right upper body: No supraclavicular, axillary or pectoral adenopathy.      Left upper body: No supraclavicular, axillary or pectoral adenopathy.      Lower Body: No right inguinal adenopathy. No left inguinal adenopathy.   Skin:     General: Skin is warm and dry.      Findings: No bruising, erythema or rash.   Neurological:      Mental Status: She is alert and oriented to person, place, and time.      Coordination: Coordination normal.   Psychiatric:         Mood and Affect: Mood normal.         Behavior: Behavior normal.         Thought Content: Thought content normal.         Judgment: Judgment normal.         Assessment & Plan   Diagnoses and all orders for this visit:    1. Well woman exam with routine gynecological exam (Primary)  -     Liquid-based Pap Smear, Screening    2. Screen for STD (sexually transmitted disease)  -     Liquid-based Pap Smear, Screening  -     RPR Qualitative with Reflex to Quant  -     HIV-1 / O / 2 Ag / Antibody  -     HSV 1 & 2 - Specific Antibody, IgG  -     Hepatitis Panel, Acute    3. Encounter for screening mammogram for breast cancer  -     Mammo Screening Digital Tomosynthesis Bilateral With CAD; Future         Normal GYN exam. Encouraged SBE, pt is aware how to do self breast exam and the importance of same. Discussed weight management and importance of maintaining a healthy weight. Discussed Vitamin D intake and the importance of adequate vitamin D for both bone health and a healthy immune system.  Discussed daily exercise and the importance of same, in regards to a healthy heart as well as helping to  maintain her weight and improving her mental health.  Colonoscopy is not indicated. Mammogram is up to date. Bone density is not indicated. Pap smear is done per pt request. ASCCP guidelines discussed. HPV is done. Lab work up is up to date through PCP.     Pt states periods are normal. Declines birth control.    Requests Std testing. Denies symptoms or known exposure.        Andreea Sepulveda, BRANDY  10/28/2024    Return in about 1 year (around 10/28/2025) for Annual physical.

## 2024-10-29 LAB
C TRACH RRNA CVX QL NAA+PROBE: NOT DETECTED
GEN CATEG CVX/VAG CYTO-IMP: NORMAL
HAV IGM SERPL QL IA: NEGATIVE
HBV CORE IGM SERPL QL IA: NEGATIVE
HBV SURFACE AG SERPL QL IA: NEGATIVE
HCV AB SERPL QL IA: NORMAL
HCV IGG SERPL QL IA: NON REACTIVE
HIV 1+2 AB+HIV1 P24 AG SERPL QL IA: NON REACTIVE
HPV I/H RISK 4 DNA CVX QL PROBE+SIG AMP: NOT DETECTED
HSV1 IGG SER IA-ACNC: REACTIVE
HSV2 IGG SER IA-ACNC: NON REACTIVE
LAB AP CASE REPORT: NORMAL
LAB AP GYN ADDITIONAL INFORMATION: NORMAL
LAB AP GYN OTHER FINDINGS: NORMAL
Lab: NORMAL
N GONORRHOEA RRNA SPEC QL NAA+PROBE: NOT DETECTED
PATH INTERP SPEC-IMP: NORMAL
RPR SER QL: NON REACTIVE
STAT OF ADQ CVX/VAG CYTO-IMP: NORMAL
TRICHOMONAS VAGINALIS PCR: NOT DETECTED

## 2024-11-04 DIAGNOSIS — M25.512 PAIN IN JOINT OF LEFT SHOULDER: Primary | ICD-10-CM

## 2024-11-04 DIAGNOSIS — N76.0 VAGINAL INFECTION: Primary | ICD-10-CM

## 2024-11-04 RX ORDER — METRONIDAZOLE 500 MG/1
500 TABLET ORAL 2 TIMES DAILY
Qty: 14 TABLET | Refills: 0 | Status: SHIPPED | OUTPATIENT
Start: 2024-11-04 | End: 2024-11-11

## 2024-11-05 ENCOUNTER — TELEPHONE (OUTPATIENT)
Dept: FAMILY MEDICINE CLINIC | Facility: CLINIC | Age: 44
End: 2024-11-05

## 2024-11-05 ENCOUNTER — OFFICE VISIT (OUTPATIENT)
Dept: FAMILY MEDICINE CLINIC | Facility: CLINIC | Age: 44
End: 2024-11-05
Payer: COMMERCIAL

## 2024-11-05 ENCOUNTER — TELEPHONE (OUTPATIENT)
Dept: FAMILY MEDICINE CLINIC | Facility: CLINIC | Age: 44
End: 2024-11-05
Payer: COMMERCIAL

## 2024-11-05 VITALS
HEIGHT: 62 IN | WEIGHT: 185.2 LBS | DIASTOLIC BLOOD PRESSURE: 74 MMHG | HEART RATE: 104 BPM | BODY MASS INDEX: 34.08 KG/M2 | TEMPERATURE: 97.1 F | SYSTOLIC BLOOD PRESSURE: 112 MMHG | OXYGEN SATURATION: 99 %

## 2024-11-05 DIAGNOSIS — F41.9 ANXIETY: ICD-10-CM

## 2024-11-05 DIAGNOSIS — H60.501 ACUTE OTITIS EXTERNA OF RIGHT EAR, UNSPECIFIED TYPE: ICD-10-CM

## 2024-11-05 DIAGNOSIS — J02.0 STREP PHARYNGITIS: Primary | ICD-10-CM

## 2024-11-05 DIAGNOSIS — F33.1 MODERATE EPISODE OF RECURRENT MAJOR DEPRESSIVE DISORDER: ICD-10-CM

## 2024-11-05 PROCEDURE — 99214 OFFICE O/P EST MOD 30 MIN: CPT | Performed by: NURSE PRACTITIONER

## 2024-11-05 PROCEDURE — 96372 THER/PROPH/DIAG INJ SC/IM: CPT | Performed by: NURSE PRACTITIONER

## 2024-11-05 RX ORDER — OFLOXACIN 3 MG/ML
5 SOLUTION AURICULAR (OTIC) DAILY
Qty: 5 ML | Refills: 0 | Status: SHIPPED | OUTPATIENT
Start: 2024-11-05

## 2024-11-05 RX ORDER — CEFTRIAXONE 1 G/1
1 INJECTION, POWDER, FOR SOLUTION INTRAMUSCULAR; INTRAVENOUS ONCE
Status: COMPLETED | OUTPATIENT
Start: 2024-11-05 | End: 2024-11-05

## 2024-11-05 RX ORDER — NEOMYCIN SULFATE, POLYMYXIN B SULFATE, HYDROCORTISONE 3.5; 10000; 1 MG/ML; [USP'U]/ML; MG/ML
3 SOLUTION/ DROPS AURICULAR (OTIC) 4 TIMES DAILY
Qty: 10 ML | Refills: 0 | Status: SHIPPED | OUTPATIENT
Start: 2024-11-05 | End: 2024-11-05

## 2024-11-05 RX ORDER — DEXAMETHASONE SODIUM PHOSPHATE 4 MG/ML
4 INJECTION, SOLUTION INTRA-ARTICULAR; INTRALESIONAL; INTRAMUSCULAR; INTRAVENOUS; SOFT TISSUE ONCE
Status: COMPLETED | OUTPATIENT
Start: 2024-11-05 | End: 2024-11-05

## 2024-11-05 RX ADMIN — DEXAMETHASONE SODIUM PHOSPHATE 4 MG: 4 INJECTION, SOLUTION INTRA-ARTICULAR; INTRALESIONAL; INTRAMUSCULAR; INTRAVENOUS; SOFT TISSUE at 16:00

## 2024-11-05 RX ADMIN — CEFTRIAXONE 1 G: 1 INJECTION, POWDER, FOR SOLUTION INTRAMUSCULAR; INTRAVENOUS at 16:00

## 2024-11-05 NOTE — LETTER
BRANDY Clifford  [address] [address]  Phone: [phone]  Fax: [phone]      PATIENT NAME: Anna Means                                                                          YOB: [birth year]    To whom it may concern,    Anna Means should be excused from work 11/4/2024 and will need to be off 3-8 days.    Electronically signed by BRANDY Mcdonnell, 11/05/24, 3:46 PM CST.

## 2024-11-05 NOTE — TELEPHONE ENCOUNTER
I cannot advise anything until I see the patient and evaluate.  Needs to bring an antibiotic so I can see what they put her on.  I am happy to see sooner if needed.    Electronically signed by BRANDY Mcdonnell, 11/05/24, 7:29 AM CST.

## 2024-11-05 NOTE — TELEPHONE ENCOUNTER
Caller: [facility] [address] - [phone] [facility] [phone]     Relationship to patient: Pharmacy    Best call back number: [phone]    Patient is needing: PHARMACY IS CALLING REGARDING THE CORTISTORIN EAR DROPS. THEY STATED THAT THEY DO NOT HAVE THE SOLUTION IN STOCK, BUT THEY HAVE THE SUSPENSION AND WOULD LIKE TO KNOW IF THEY CAN SUBSTITUTE.

## 2024-11-06 NOTE — PROGRESS NOTES
Subjective   Chief Complaint:  Reevaluation of mood, reevaluation of strep throat    History of Present Illness  The patient is a 44-year-old female presenting today for 6-week follow-up of depression, anxiety.    She reports an active case of strep throat and has been prescribed Augmentin from the hospital, but her condition has not improved.    She was recently started on Vraylar 1.5 mg daily and continues to take Cymbalta 60 mg daily. She reports an overall improvement in her mood.    Past Medical, Surgical, Social, and Family History:  Allergies   Allergen Reactions    Diphenhydramine Unknown - Low Severity    Gabapentin Delirium    Topiramate Hallucinations    Dicyclomine Palpitations and Unknown - High Severity      Past Medical History:   Diagnosis Date    Abnormal Pap smear of cervix     Allergic     Anxiety     Asthma     GERD (gastroesophageal reflux disease)     HL (hearing loss)     Hyperlipidemia     Hypertension     Irritable bowel syndrome     Migraine 2019    Ovarian cyst     PONV (postoperative nausea and vomiting)       Past Surgical History:   Procedure Laterality Date    APPENDECTOMY  3/15/2021    CERVICAL BIOPSY  W/ LOOP ELECTRODE EXCISION      TUBAL ABDOMINAL LIGATION      WISDOM TOOTH EXTRACTION        Social History     Socioeconomic History    Marital status:    Tobacco Use    Smoking status: Former     Current packs/day: 0.00     Average packs/day: 0.3 packs/day for 5.0 years (1.3 ttl pk-yrs)     Types: Cigarettes     Start date: 2000     Quit date: 2005     Years since quittin.8     Passive exposure: Past    Smokeless tobacco: Never   Vaping Use    Vaping status: Never Used   Substance and Sexual Activity    Alcohol use: Not Currently     Alcohol/week: 4.0 standard drinks of alcohol     Types: 4 Shots of liquor per week     Comment: Not all the time occasionally    Drug use: Never    Sexual activity: Yes     Partners: Male     Birth control/protection:  "Condom, Tubal ligation      Family History   Problem Relation Age of Onset    Hypertension Father     Anxiety disorder Mother     Asthma Mother     Depression Mother     Lung cancer Mother     Cancer Mother         Lung    Breast cancer Maternal Grandmother     Colon cancer Maternal Grandmother        Objective   Vital Signs  /74   Pulse 104   Temp 97.1 °F (36.2 °C) (Infrared)   Ht 157.5 cm (62\")   Wt 84 kg (185 lb 3.2 oz)   LMP 10/19/2024   SpO2 99%   BMI 33.87 kg/m²    Physical Exam  Vitals reviewed.   Constitutional:       General: She is not in acute distress.     Appearance: Normal appearance.   HENT:      Ears:      Comments: Left ear normal, right ear with canal drainage and swelling     Mouth/Throat:      Comments: Classic white patches-erythema  Cardiovascular:      Rate and Rhythm: Normal rate and regular rhythm.   Pulmonary:      Effort: Pulmonary effort is normal.      Breath sounds: Normal breath sounds.   Lymphadenopathy:      Cervical: Cervical adenopathy (*Left anterior) present.       Assessment & Plan   Assessment & Plan  1. Strep pharyngitis.  Continuation of Augmentin treatment is advised. Ceftriaxone 1 g once was administered in the office today, along with dexamethasone 4 mg IM once.    2. Chronic anxiety.  Condition has improved and is stable. Continuation of Cymbalta 60 mg daily and Vraylar 1.5 mg daily is advised.    3. Moderate episode of recurrent major depression.  Condition has improved and is stable. Continuation of Cymbalta 60 mg daily and Vraylar 1.5 mg daily is advised.    4. Acute otitis externa of right ear.  Cortisporin 3 drops in the right ear 4 times a day for 7 days.        Follow-up:  The patient will Return in about 6 months (around 5/5/2025) for follow-Up.    Records and Results Reviewed:  I reviewed current medications as given by patient and allergy list    : [employer]- and Dragon Speech Recognition - No recording technology was used in the " exam room during encounter.    Electronically signed by BRANDY Mcdonnell, 11/06/24, 7:40 AM CST.

## 2024-11-12 NOTE — PROGRESS NOTES
intact.    Radiology:   XR SHOULDER LEFT (MIN 2 VIEWS)    Result Date: 11/13/2024  AP Internal, AP external rotation, Scapular Y, and axillary lateral views of the left shoulder were obtained in the office on today's visit, reviewed and interpreted by me.   Imaging quality is adequate for review.There are no fractures or dislocations present, bone and tissue quality are normal.  Mild osteoarthritic degenerative changes with joint space narrowing and tiny osteophyte formation at the glenohumeral joint.     --------------------------------------------------------------------------------------------------------------------    Procedure:  SHOULDER GLENOHUMERAL JOINT INJECTION: Risks/benefits were explained and verbal consent was given. Under sterile conditions the skin was cleansed with Chloraprep and alcohol. Using sterile conditions 2 cc of 1% Lidocaine without epinephrine, 2 cc of Marcaine, and 40 mg of Kenalog was injected into the anterior portal of the each right and left shoulder using a 22 gauge 1-1/2 inch needle. Needle was withdrawn. Hemostasis was achieved. Sterile dressing was applied in the form of an adhesive bandage. The patient tolerated the procedure well.     Assessment:   Encounter Diagnoses   Name Primary?    Primary osteoarthritis of right shoulder Yes    Primary osteoarthritis of left shoulder         Plan:    Return if symptoms worsen or fail to improve.   At the patient's request, we will proceed with bilateral glenohumeral joint corticosteroid injections today.  We discussed ice, topicals, anti-inflammatories in addition.  We will see her back on an as-needed basis.  All questions were answered.     Electronically signed by Jayne Branham PA-C on 11/13/2024 at 9:03 AM

## 2024-11-13 ENCOUNTER — OFFICE VISIT (OUTPATIENT)
Age: 44
End: 2024-11-13
Payer: COMMERCIAL

## 2024-11-13 VITALS — BODY MASS INDEX: 33.13 KG/M2 | WEIGHT: 180 LBS | HEIGHT: 62 IN

## 2024-11-13 DIAGNOSIS — M19.012 PRIMARY OSTEOARTHRITIS OF LEFT SHOULDER: ICD-10-CM

## 2024-11-13 DIAGNOSIS — M19.011 PRIMARY OSTEOARTHRITIS OF RIGHT SHOULDER: Primary | ICD-10-CM

## 2024-11-13 PROCEDURE — 20610 DRAIN/INJ JOINT/BURSA W/O US: CPT

## 2024-11-13 PROCEDURE — 99213 OFFICE O/P EST LOW 20 MIN: CPT

## 2024-11-13 RX ORDER — LIDOCAINE HYDROCHLORIDE 10 MG/ML
2 INJECTION, SOLUTION INFILTRATION; PERINEURAL ONCE
Status: COMPLETED | OUTPATIENT
Start: 2024-11-13 | End: 2024-11-13

## 2024-11-13 RX ORDER — TRIAMCINOLONE ACETONIDE 40 MG/ML
40 INJECTION, SUSPENSION INTRA-ARTICULAR; INTRAMUSCULAR ONCE
Status: COMPLETED | OUTPATIENT
Start: 2024-11-13 | End: 2024-11-13

## 2024-11-13 RX ORDER — BUPIVACAINE HYDROCHLORIDE 2.5 MG/ML
2 INJECTION, SOLUTION INFILTRATION; PERINEURAL ONCE
Status: COMPLETED | OUTPATIENT
Start: 2024-11-13 | End: 2024-11-13

## 2024-11-13 RX ORDER — ONDANSETRON 4 MG/1
TABLET, ORALLY DISINTEGRATING ORAL
COMMUNITY

## 2024-11-13 RX ORDER — METRONIDAZOLE 500 MG/1
TABLET ORAL
COMMUNITY
Start: 2024-11-04

## 2024-11-13 RX ORDER — OFLOXACIN 3 MG/ML
5 SOLUTION AURICULAR (OTIC) DAILY
COMMUNITY
Start: 2024-11-05

## 2024-11-13 RX ADMIN — LIDOCAINE HYDROCHLORIDE 2 ML: 10 INJECTION, SOLUTION INFILTRATION; PERINEURAL at 08:36

## 2024-11-13 RX ADMIN — TRIAMCINOLONE ACETONIDE 40 MG: 40 INJECTION, SUSPENSION INTRA-ARTICULAR; INTRAMUSCULAR at 08:37

## 2024-11-13 RX ADMIN — LIDOCAINE HYDROCHLORIDE 2 ML: 10 INJECTION, SOLUTION INFILTRATION; PERINEURAL at 08:37

## 2024-11-13 RX ADMIN — BUPIVACAINE HYDROCHLORIDE 5 MG: 2.5 INJECTION, SOLUTION INFILTRATION; PERINEURAL at 08:35

## 2024-11-13 RX ADMIN — BUPIVACAINE HYDROCHLORIDE 5 MG: 2.5 INJECTION, SOLUTION INFILTRATION; PERINEURAL at 08:36

## 2024-11-26 ENCOUNTER — TELEPHONE (OUTPATIENT)
Dept: HEMATOLOGY | Age: 44
End: 2024-11-26

## 2024-11-26 NOTE — TELEPHONE ENCOUNTER

## 2024-12-02 NOTE — PROGRESS NOTES
normal  Hepatitis, HIV nonreactive  Suspect bruising to be medication related, EtOH, B12 deficiency, marginal vitamin C level  Rash/dermatitis    ASSESSMENT:    1. Easy bruising    2. B12 deficiency    3. Low serum vitamin C    4. Rash    5. Medication management      PLAN:  Bruising improved  Administer B12 1000 mcg subcu injection today, weekly x 2, then monthly thereafter   Continue vitamin C 500 mg po daily OTC   Check vitamin D, zinc levels  Rx Medrol Dosepak, if no improvement in rash, patient is agreeable to follow-up with PCP    Orders Placed This Encounter   Procedures    Zinc    Vitamin D 25 Hydroxy    Vitamin B12    CBC with Auto Differential     Orders Placed This Encounter   Medications    methylPREDNISolone (MEDROL DOSEPACK) 4 MG tablet     Sig: Take by mouth as directed     Dispense:  1 kit     Refill:  0     Tumor Screening:  Mammograms [facility]/12/2024 at [facility]: BI-RADS Category 1  Pap smear: 10/28/2024 at [facility] by SRAVANTHI Love: Negative for intraepithelial lesion or malignancy.   Colonoscopy: 6/2021 in [place]    RTC RN every week x2 for B12 injection, then monthly thereafter  Return in about 6 months (around 6/3/2025) for follow up with SRAVANTHI Parks wit B12 injection .    I have seen, examined and reviewed this patient medication list, appropriate labs and imaging studies. I reviewed relevant medical records and others physician’s notes. I discussed the plan of care with the patient. I answered all questions to the patient’s satisfaction. I have also reviewed the chief complaint (CC) and part of the history (History of Present Illness (HPI), Past Family Social History (PFSH), or Review of Systems (ROS) and made changes when appropriate.     Dictated utilizing Dragon transcription software.      Electronically signed by SRAVANTHI Hall on 12/3/2024 at 10:22 AM

## 2024-12-03 ENCOUNTER — HOSPITAL ENCOUNTER (OUTPATIENT)
Dept: INFUSION THERAPY | Age: 44
Discharge: HOME | End: 2024-12-03
Payer: COMMERCIAL

## 2024-12-03 ENCOUNTER — OFFICE VISIT (OUTPATIENT)
Dept: HEMATOLOGY | Age: 44
End: 2024-12-03
Payer: COMMERCIAL

## 2024-12-03 VITALS
WEIGHT: 189.4 LBS | TEMPERATURE: 97.3 F | HEART RATE: 97 BPM | HEIGHT: 62 IN | SYSTOLIC BLOOD PRESSURE: 120 MMHG | OXYGEN SATURATION: 98 % | BODY MASS INDEX: 34.85 KG/M2 | DIASTOLIC BLOOD PRESSURE: 78 MMHG

## 2024-12-03 DIAGNOSIS — E53.8 VITAMIN B12 DEFICIENCY: Primary | ICD-10-CM

## 2024-12-03 DIAGNOSIS — R23.3 EASY BRUISING: ICD-10-CM

## 2024-12-03 DIAGNOSIS — E53.8 B12 DEFICIENCY: ICD-10-CM

## 2024-12-03 DIAGNOSIS — R21 RASH: ICD-10-CM

## 2024-12-03 DIAGNOSIS — Z79.899 MEDICATION MANAGEMENT: ICD-10-CM

## 2024-12-03 DIAGNOSIS — R23.3 EASY BRUISING: Primary | ICD-10-CM

## 2024-12-03 DIAGNOSIS — R79.89 LOW SERUM VITAMIN C: ICD-10-CM

## 2024-12-03 LAB
25(OH)D3 SERPL-MCNC: 21.1 NG/ML
BASOPHILS # BLD: 0.03 K/UL (ref 0.01–0.08)
BASOPHILS NFR BLD: 0.4 % (ref 0.1–1.2)
EOSINOPHIL # BLD: 0.12 K/UL (ref 0.04–0.54)
EOSINOPHIL NFR BLD: 1.5 % (ref 0.7–7)
ERYTHROCYTE [DISTWIDTH] IN BLOOD BY AUTOMATED COUNT: 13.5 % (ref 11.7–14.4)
HCT VFR BLD AUTO: 40.4 % (ref 34.1–44.9)
HGB BLD-MCNC: 13.6 G/DL (ref 11.2–15.7)
LYMPHOCYTES # BLD: 1.5 K/UL (ref 1.18–3.74)
LYMPHOCYTES NFR BLD: 19.2 % (ref 19.3–53.1)
MCH RBC QN AUTO: 29.7 PG (ref 25.6–32.2)
MCHC RBC AUTO-ENTMCNC: 33.7 G/DL (ref 32.3–35.5)
MCV RBC AUTO: 88.2 FL (ref 79.4–94.8)
MONOCYTES # BLD: 0.31 K/UL (ref 0.24–0.82)
MONOCYTES NFR BLD: 4 % (ref 4.7–12.5)
NEUTROPHILS # BLD: 5.84 K/UL (ref 1.56–6.13)
NEUTS SEG NFR BLD: 74.6 % (ref 34–71.1)
PLATELET # BLD AUTO: 282 K/UL (ref 182–369)
PMV BLD AUTO: 9.6 FL (ref 7.4–10.4)
RBC # BLD AUTO: 4.58 M/UL (ref 3.93–5.22)
VIT B12 SERPL-MCNC: 186 PG/ML (ref 232–1245)
WBC # BLD AUTO: 7.82 K/UL (ref 3.98–10.04)

## 2024-12-03 PROCEDURE — 6360000002 HC RX W HCPCS: Performed by: NURSE PRACTITIONER

## 2024-12-03 PROCEDURE — 85025 COMPLETE CBC W/AUTO DIFF WBC: CPT

## 2024-12-03 PROCEDURE — 36415 COLL VENOUS BLD VENIPUNCTURE: CPT

## 2024-12-03 PROCEDURE — 96372 THER/PROPH/DIAG INJ SC/IM: CPT

## 2024-12-03 PROCEDURE — 99214 OFFICE O/P EST MOD 30 MIN: CPT | Performed by: NURSE PRACTITIONER

## 2024-12-03 PROCEDURE — 99213 OFFICE O/P EST LOW 20 MIN: CPT

## 2024-12-03 PROCEDURE — G2211 COMPLEX E/M VISIT ADD ON: HCPCS | Performed by: NURSE PRACTITIONER

## 2024-12-03 RX ORDER — METHYLPREDNISOLONE 4 MG/1
TABLET ORAL
Qty: 1 KIT | Refills: 0 | Status: SHIPPED | OUTPATIENT
Start: 2024-12-03 | End: 2024-12-09

## 2024-12-03 RX ORDER — CYANOCOBALAMIN 1000 UG/ML
1000 INJECTION, SOLUTION INTRAMUSCULAR; SUBCUTANEOUS ONCE
Status: CANCELLED | OUTPATIENT
Start: 2024-12-06

## 2024-12-03 RX ORDER — CYANOCOBALAMIN 1000 UG/ML
1000 INJECTION, SOLUTION INTRAMUSCULAR; SUBCUTANEOUS ONCE
OUTPATIENT
Start: 2024-12-06 | End: 2024-12-06

## 2024-12-03 RX ORDER — ZINC OXIDE 13 %
1 CREAM (GRAM) TOPICAL DAILY
COMMUNITY

## 2024-12-03 RX ORDER — CYANOCOBALAMIN 1000 UG/ML
1000 INJECTION, SOLUTION INTRAMUSCULAR; SUBCUTANEOUS ONCE
Status: COMPLETED | OUTPATIENT
Start: 2024-12-03 | End: 2024-12-03

## 2024-12-03 RX ORDER — CYANOCOBALAMIN 1000 UG/ML
1000 INJECTION, SOLUTION INTRAMUSCULAR; SUBCUTANEOUS
COMMUNITY

## 2024-12-03 RX ADMIN — CYANOCOBALAMIN 1000 MCG: 1000 INJECTION, SOLUTION INTRAMUSCULAR; SUBCUTANEOUS at 10:09

## 2024-12-03 ASSESSMENT — ENCOUNTER SYMPTOMS
ABDOMINAL PAIN: 0
COUGH: 0
CONSTIPATION: 0
BACK PAIN: 1
EYE ITCHING: 0
DIARRHEA: 0
VOMITING: 0
WHEEZING: 0
NAUSEA: 0
SORE THROAT: 0
TROUBLE SWALLOWING: 0
SHORTNESS OF BREATH: 0
EYE DISCHARGE: 0

## 2024-12-05 LAB — ZINC SERPL-MCNC: 78.6 UG/DL (ref 60–120)

## 2024-12-10 ENCOUNTER — HOSPITAL ENCOUNTER (OUTPATIENT)
Dept: INFUSION THERAPY | Age: 44
Discharge: HOME | End: 2024-12-10
Payer: COMMERCIAL

## 2024-12-10 VITALS
OXYGEN SATURATION: 100 % | TEMPERATURE: 97.5 F | DIASTOLIC BLOOD PRESSURE: 66 MMHG | HEART RATE: 94 BPM | RESPIRATION RATE: 20 BRPM | SYSTOLIC BLOOD PRESSURE: 151 MMHG

## 2024-12-10 DIAGNOSIS — E53.8 VITAMIN B12 DEFICIENCY: Primary | ICD-10-CM

## 2024-12-10 PROCEDURE — 96372 THER/PROPH/DIAG INJ SC/IM: CPT

## 2024-12-10 PROCEDURE — 6360000002 HC RX W HCPCS: Performed by: NURSE PRACTITIONER

## 2024-12-10 RX ORDER — LOSARTAN POTASSIUM 50 MG/1
50 TABLET ORAL DAILY
Qty: 30 TABLET | Refills: 5 | Status: SHIPPED | OUTPATIENT
Start: 2024-12-10

## 2024-12-10 RX ORDER — CYANOCOBALAMIN 1000 UG/ML
1000 INJECTION, SOLUTION INTRAMUSCULAR; SUBCUTANEOUS ONCE
Status: CANCELLED | OUTPATIENT
Start: 2024-12-13

## 2024-12-10 RX ORDER — CYANOCOBALAMIN 1000 UG/ML
1000 INJECTION, SOLUTION INTRAMUSCULAR; SUBCUTANEOUS ONCE
Status: COMPLETED | OUTPATIENT
Start: 2024-12-10 | End: 2024-12-10

## 2024-12-10 RX ORDER — CYANOCOBALAMIN 1000 UG/ML
1000 INJECTION, SOLUTION INTRAMUSCULAR; SUBCUTANEOUS ONCE
OUTPATIENT
Start: 2024-12-13 | End: 2024-12-13

## 2024-12-10 RX ADMIN — CYANOCOBALAMIN 1000 MCG: 1000 INJECTION, SOLUTION INTRAMUSCULAR; SUBCUTANEOUS at 09:09

## 2024-12-17 ENCOUNTER — HOSPITAL ENCOUNTER (OUTPATIENT)
Dept: INFUSION THERAPY | Age: 44
Discharge: HOME | End: 2024-12-17
Payer: COMMERCIAL

## 2024-12-17 DIAGNOSIS — E53.8 VITAMIN B12 DEFICIENCY: Primary | ICD-10-CM

## 2024-12-17 PROCEDURE — 6360000002 HC RX W HCPCS: Performed by: NURSE PRACTITIONER

## 2024-12-17 PROCEDURE — 96372 THER/PROPH/DIAG INJ SC/IM: CPT

## 2024-12-17 RX ORDER — CYANOCOBALAMIN 1000 UG/ML
1000 INJECTION, SOLUTION INTRAMUSCULAR; SUBCUTANEOUS ONCE
Status: CANCELLED | OUTPATIENT
Start: 2025-01-07

## 2024-12-17 RX ORDER — CYANOCOBALAMIN 1000 UG/ML
1000 INJECTION, SOLUTION INTRAMUSCULAR; SUBCUTANEOUS ONCE
OUTPATIENT
Start: 2025-01-07 | End: 2025-01-07

## 2024-12-17 RX ORDER — CYANOCOBALAMIN 1000 UG/ML
1000 INJECTION, SOLUTION INTRAMUSCULAR; SUBCUTANEOUS ONCE
Status: COMPLETED | OUTPATIENT
Start: 2024-12-17 | End: 2024-12-17

## 2024-12-17 RX ADMIN — CYANOCOBALAMIN 1000 MCG: 1000 INJECTION, SOLUTION INTRAMUSCULAR; SUBCUTANEOUS at 11:22

## 2024-12-31 ENCOUNTER — TELEPHONE (OUTPATIENT)
Dept: HEMATOLOGY | Age: 44
End: 2024-12-31

## 2024-12-31 NOTE — TELEPHONE ENCOUNTER
Patient called to update insurance prior to appointment 1/3/25- [facility]- Patient to call back and provide filing address information-will bring paperwork with new coverage but will not receive actual card until late January 2025-th

## 2025-01-08 ENCOUNTER — TELEPHONE (OUTPATIENT)
Dept: FAMILY MEDICINE CLINIC | Facility: CLINIC | Age: 45
End: 2025-01-08

## 2025-01-08 NOTE — TELEPHONE ENCOUNTER
Caller: [facility]    Relationship: OTHER    Best call back number:  [phone]    What form or medical record are you requesting: PA FOR [facility].  SHE STATED THEY SENT FORM OVER LAST WEEK.

## 2025-01-09 ENCOUNTER — HOSPITAL ENCOUNTER (OUTPATIENT)
Dept: INFUSION THERAPY | Age: 45
Discharge: HOME | End: 2025-01-09
Payer: COMMERCIAL

## 2025-01-09 VITALS
SYSTOLIC BLOOD PRESSURE: 118 MMHG | OXYGEN SATURATION: 100 % | DIASTOLIC BLOOD PRESSURE: 77 MMHG | TEMPERATURE: 97.5 F | RESPIRATION RATE: 16 BRPM | HEART RATE: 81 BPM

## 2025-01-09 DIAGNOSIS — E53.8 VITAMIN B12 DEFICIENCY: Primary | ICD-10-CM

## 2025-01-09 PROCEDURE — 6360000002 HC RX W HCPCS: Performed by: NURSE PRACTITIONER

## 2025-01-09 PROCEDURE — 96372 THER/PROPH/DIAG INJ SC/IM: CPT

## 2025-01-09 RX ORDER — CYANOCOBALAMIN 1000 UG/ML
1000 INJECTION, SOLUTION INTRAMUSCULAR; SUBCUTANEOUS ONCE
OUTPATIENT
Start: 2025-02-04 | End: 2025-02-04

## 2025-01-09 RX ORDER — CYANOCOBALAMIN 1000 UG/ML
1000 INJECTION, SOLUTION INTRAMUSCULAR; SUBCUTANEOUS ONCE
Status: COMPLETED | OUTPATIENT
Start: 2025-01-09 | End: 2025-01-09

## 2025-01-09 RX ADMIN — CYANOCOBALAMIN 1000 MCG: 1000 INJECTION, SOLUTION INTRAMUSCULAR; SUBCUTANEOUS at 09:40

## 2025-01-13 ENCOUNTER — OFFICE VISIT (OUTPATIENT)
Dept: FAMILY MEDICINE CLINIC | Facility: CLINIC | Age: 45
End: 2025-01-13
Payer: COMMERCIAL

## 2025-01-13 VITALS
HEIGHT: 63 IN | DIASTOLIC BLOOD PRESSURE: 76 MMHG | WEIGHT: 197 LBS | HEART RATE: 64 BPM | BODY MASS INDEX: 34.91 KG/M2 | SYSTOLIC BLOOD PRESSURE: 124 MMHG | OXYGEN SATURATION: 97 %

## 2025-01-13 DIAGNOSIS — H92.12 DRAINAGE FROM LEFT EAR: ICD-10-CM

## 2025-01-13 DIAGNOSIS — J01.90 ACUTE NON-RECURRENT SINUSITIS, UNSPECIFIED LOCATION: Primary | ICD-10-CM

## 2025-01-13 DIAGNOSIS — Z98.890 HISTORY OF MYRINGOTOMY: ICD-10-CM

## 2025-01-13 PROCEDURE — 99213 OFFICE O/P EST LOW 20 MIN: CPT | Performed by: NURSE PRACTITIONER

## 2025-01-13 NOTE — PROGRESS NOTES
"Chief Complaint  Ear Drainage    Subjective      Anna Means presents to [facility]  HPI: Patient is a 44 y.o. female who is here today with complaint of clear drainage from left ear that started approximately 1 week ago. Reports she has had some fullness/pressure/sloshing and mild discomfort. She has had runny nose/nasal congestion for over a week and now has sinus pressure. She does follow ENT and has a follow up on 1/21/24. States Dr. Gurrola made a hole in her ear to allow fluid to drain. She takes Singulair at night and uses Flonase and Azelastine.     Objective   Vital Signs:  /76   Pulse 64   Ht 158.8 cm (62.5\")   Wt 89.4 kg (197 lb)   SpO2 97%   BMI 35.46 kg/m²   Estimated body mass index is 35.46 kg/m² as calculated from the following:    Height as of this encounter: 158.8 cm (62.5\").    Weight as of this encounter: 89.4 kg (197 lb).            Physical Exam  Constitutional:       Appearance: Normal appearance.   HENT:      Left Ear: Tympanic membrane is perforated ((surgical, no PE tube noted)). Tympanic membrane is not erythematous or bulging.      Ears:      Comments: No purulent drainage noted. There is scant serous fluid in external ear canal.      Nose:      Left Sinus: Maxillary sinus tenderness present.      Mouth/Throat:      Pharynx: Posterior oropharyngeal erythema (mild) and postnasal drip present.   Cardiovascular:      Rate and Rhythm: Normal rate and regular rhythm.   Pulmonary:      Effort: Pulmonary effort is normal.      Breath sounds: Normal breath sounds.   Lymphadenopathy:      Cervical:      Right cervical: No superficial cervical adenopathy.  Neurological:      Mental Status: She is alert.   Psychiatric:         Mood and Affect: Mood normal.        Result Review :  The following labs/imaging/notes were reviewed and discussed with the patient by BRANDY Kay on 01/13/2025:             Assessment and Plan   Diagnoses and all " orders for this visit:    1. Acute non-recurrent sinusitis, unspecified location (Primary)  -     amoxicillin-clavulanate (AUGMENTIN) 875-125 MG per tablet; Take 1 tablet by mouth 2 (Two) Times a Day for 7 days.  Dispense: 14 tablet; Refill: 0    2. History of myringotomy    3. Drainage from left ear      I have sent antibiotic to help treat sinusitis. Otitis externa/media does not appear to be present at this time. Advised to continue all medications and to add daily Zyrtec, Claritin, Allegra, or Xyzal daily to current regimen. Keep follow up with ENT.          Follow Up  No follow-ups on file.  Patient was given instructions and counseling regarding her condition or for health maintenance advice. Please see specific information pulled into the AVS if appropriate.

## 2025-01-27 ENCOUNTER — OFFICE VISIT (OUTPATIENT)
Dept: FAMILY MEDICINE CLINIC | Facility: CLINIC | Age: 45
End: 2025-01-27
Payer: COMMERCIAL

## 2025-01-27 ENCOUNTER — HOSPITAL ENCOUNTER (OUTPATIENT)
Dept: GENERAL RADIOLOGY | Facility: HOSPITAL | Age: 45
Discharge: HOME | End: 2025-01-27
Admitting: NURSE PRACTITIONER
Payer: COMMERCIAL

## 2025-01-27 VITALS
DIASTOLIC BLOOD PRESSURE: 80 MMHG | WEIGHT: 193.4 LBS | SYSTOLIC BLOOD PRESSURE: 112 MMHG | HEART RATE: 76 BPM | BODY MASS INDEX: 34.27 KG/M2 | HEIGHT: 63 IN | OXYGEN SATURATION: 99 %

## 2025-01-27 DIAGNOSIS — R09.89 CHEST CONGESTION: ICD-10-CM

## 2025-01-27 DIAGNOSIS — R06.2 WHEEZING: ICD-10-CM

## 2025-01-27 DIAGNOSIS — R05.1 ACUTE COUGH: ICD-10-CM

## 2025-01-27 DIAGNOSIS — R05.1 ACUTE COUGH: Primary | ICD-10-CM

## 2025-01-27 PROCEDURE — 99213 OFFICE O/P EST LOW 20 MIN: CPT | Performed by: NURSE PRACTITIONER

## 2025-01-27 PROCEDURE — 71046 X-RAY EXAM CHEST 2 VIEWS: CPT

## 2025-01-27 NOTE — PROGRESS NOTES
"Chief Complaint  Wheezing (Patient reports she is having some pain in her (L) side/back area - she is currently on ABX (amoxil) - she reports also having a little bit of a cough and is being treating for a sinus infection)    Subjective      Anna Means presents to [facility]  HPI: Patient is a 44 y.o. female who is here today with complaint of shortness of breath, pain to left side/upper back that started today. She is coughing some, is not productive. She feels like her test is tight and she is wheezing. She was seen here on 1/13/25 and was sent Augmentin for sinusitis. Reports she has seen ENT since who started her on Amoxil for ear infection.     Objective   Vital Signs:  /80   Pulse 76   Ht 158.8 cm (62.5\")   Wt 87.7 kg (193 lb 6.4 oz)   SpO2 99%   BMI 34.81 kg/m²   Estimated body mass index is 34.81 kg/m² as calculated from the following:    Height as of this encounter: 158.8 cm (62.5\").    Weight as of this encounter: 87.7 kg (193 lb 6.4 oz).            Physical Exam  Constitutional:       Appearance: Normal appearance.   Cardiovascular:      Rate and Rhythm: Normal rate and regular rhythm.   Pulmonary:      Effort: Pulmonary effort is normal.      Breath sounds: Normal breath sounds. Decreased air movement present.   Neurological:      Mental Status: She is alert.   Psychiatric:         Mood and Affect: Mood normal.        Result Review :  The following labs/imaging/notes were reviewed and discussed with the patient by BRANDY Kay on 01/27/2025:             Assessment and Plan   Diagnoses and all orders for this visit:    1. Acute cough (Primary)  -     XR Chest 2 View; Future    2. Wheezing  -     XR Chest 2 View; Future    3. Chest congestion  -     XR Chest 2 View; Future      Will order x-ray to rule out pneumonia. Patient has had quite a few steroids in the last couple of months, will wait on chest x-ray results before sending any medications. " Increase water intake. If symptoms become severe, go to ER.          Follow Up  No follow-ups on file.  Patient was given instructions and counseling regarding her condition or for health maintenance advice. Please see specific information pulled into the AVS if appropriate.

## 2025-01-29 DIAGNOSIS — J45.20 MILD INTERMITTENT ASTHMA WITHOUT COMPLICATION: ICD-10-CM

## 2025-01-29 DIAGNOSIS — R06.2 WHEEZING: Primary | ICD-10-CM

## 2025-01-29 RX ORDER — SODIUM CHLORIDE FOR INHALATION 0.9 %
3 VIAL, NEBULIZER (ML) INHALATION AS NEEDED
Qty: 120 ML | Refills: 2 | Status: SHIPPED | OUTPATIENT
Start: 2025-01-29

## 2025-01-29 RX ORDER — LEVALBUTEROL INHALATION SOLUTION 1.25 MG/3ML
SOLUTION RESPIRATORY (INHALATION)
Qty: 120 ML | Refills: 2 | Status: SHIPPED | OUTPATIENT
Start: 2025-01-29

## 2025-01-30 DIAGNOSIS — F33.1 MODERATE EPISODE OF RECURRENT MAJOR DEPRESSIVE DISORDER: ICD-10-CM

## 2025-03-25 NOTE — PROGRESS NOTES
Orthopaedic Clinic Note - Established Patient    NAME:  Rafaela Faye   : 1980  MRN: [id number]    3/31/2025    CHIEF COMPLAINT:  follow up left shoulder pain, repeat injection    HISTORY OF PRESENT ILLNESS:   The patient is a 45 y.o. female who returns today for follow up of left shoulder pain, requesting repeat injection.  Last injection was in November. Patient denies any recent trauma, fall, or other other injury. Pain is rated 8/10 today.    Past Medical History:        Diagnosis Date    Abnormal Pap smear of cervix     Anxiety     Arthritis 2023    Asthma     GERD (gastroesophageal reflux disease)     HTN (hypertension)     Hyperlipidemia     IBS (irritable bowel syndrome)     Migraine     Ovarian cyst     Palpitation        Past Surgical History:        Procedure Laterality Date    APPENDECTOMY  2021    CERVICAL BIOPSY  W/ LOOP ELECTRODE EXCISION      TUBAL LIGATION      WISDOM TOOTH EXTRACTION         Current Medications:   Prior to Admission medications    Medication Sig Start Date End Date Taking? Authorizing Provider   diclofenac (VOLTAREN) 75 MG EC tablet Take 1 tablet by mouth 2 times daily 3/31/25  Yes Jayne Branham PA-C   Probiotic Product (PROBIOTIC DAILY) CAPS Take 1 capsule by mouth daily   Yes Jose Quintanilla MD   cyanocobalamin 1000 MCG/ML injection Inject 1 mL into the muscle every 30 days Delivered in hematology clinic   Yes Jose Quintanilla MD   ondansetron (ZOFRAN-ODT) 4 MG disintegrating tablet DISSOLVE 1 TABLET ON THE TONGUE EVERY 6 HOURS AS NEEDED FOR NAUSEA OR VOMITING   Yes Jose Quintanilla MD   cariprazine hcl (VRAYLAR) 1.5 MG capsule Take 1 capsule by mouth daily 24  Yes Jose Quintanilla MD   EPINEPHrine (EPIPEN) 0.3 MG/0.3ML SOAJ injection Inject 0.3 mLs into the muscle as needed (as needed for allergic reaction) 24  Yes Jose Quintanilla MD   vitamin C (ASCORBIC ACID) 500 MG tablet Take 1 tablet by mouth daily   Yes Dwight

## 2025-03-31 ENCOUNTER — OFFICE VISIT (OUTPATIENT)
Age: 45
End: 2025-03-31
Payer: COMMERCIAL

## 2025-03-31 VITALS — HEIGHT: 62 IN | WEIGHT: 198 LBS | BODY MASS INDEX: 36.44 KG/M2

## 2025-03-31 DIAGNOSIS — M19.012 PRIMARY OSTEOARTHRITIS OF LEFT SHOULDER: Primary | ICD-10-CM

## 2025-03-31 PROCEDURE — 20610 DRAIN/INJ JOINT/BURSA W/O US: CPT

## 2025-03-31 RX ORDER — BUPIVACAINE HYDROCHLORIDE 2.5 MG/ML
2 INJECTION, SOLUTION INFILTRATION; PERINEURAL ONCE
Status: COMPLETED | OUTPATIENT
Start: 2025-03-31 | End: 2025-03-31

## 2025-03-31 RX ORDER — TRIAMCINOLONE ACETONIDE 40 MG/ML
40 INJECTION, SUSPENSION INTRA-ARTICULAR; INTRAMUSCULAR ONCE
Status: COMPLETED | OUTPATIENT
Start: 2025-03-31 | End: 2025-03-31

## 2025-03-31 RX ORDER — DICLOFENAC SODIUM 75 MG/1
75 TABLET, DELAYED RELEASE ORAL 2 TIMES DAILY
Qty: 60 TABLET | Refills: 1 | Status: SHIPPED | OUTPATIENT
Start: 2025-03-31

## 2025-03-31 RX ORDER — LIDOCAINE HYDROCHLORIDE 10 MG/ML
2 INJECTION, SOLUTION INFILTRATION; PERINEURAL ONCE
Status: COMPLETED | OUTPATIENT
Start: 2025-03-31 | End: 2025-03-31

## 2025-03-31 RX ADMIN — LIDOCAINE HYDROCHLORIDE 2 ML: 10 INJECTION, SOLUTION INFILTRATION; PERINEURAL at 08:11

## 2025-03-31 RX ADMIN — TRIAMCINOLONE ACETONIDE 40 MG: 40 INJECTION, SUSPENSION INTRA-ARTICULAR; INTRAMUSCULAR at 08:11

## 2025-03-31 RX ADMIN — BUPIVACAINE HYDROCHLORIDE 5 MG: 2.5 INJECTION, SOLUTION INFILTRATION; PERINEURAL at 08:11

## 2025-05-30 ENCOUNTER — TELEPHONE (OUTPATIENT)
Dept: HEMATOLOGY | Age: 45
End: 2025-05-30

## 2025-07-21 ENCOUNTER — OFFICE VISIT (OUTPATIENT)
Age: 45
End: 2025-07-21
Payer: COMMERCIAL

## 2025-07-21 VITALS — BODY MASS INDEX: 36.44 KG/M2 | WEIGHT: 198 LBS | HEIGHT: 62 IN
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
## 2025-07-21 DIAGNOSIS — S46.012A TRAUMATIC INCOMPLETE TEAR OF LEFT ROTATOR CUFF, INITIAL ENCOUNTER: ICD-10-CM

## 2025-07-21 DIAGNOSIS — M19.012 PRIMARY OSTEOARTHRITIS OF LEFT SHOULDER: Primary | ICD-10-CM

## 2025-07-21 PROCEDURE — 99213 OFFICE O/P EST LOW 20 MIN: CPT
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
## 2025-07-30 ENCOUNTER — TELEPHONE (OUTPATIENT)
Dept: HEMATOLOGY | Age: 45
End: 2025-07-30